# Patient Record
Sex: FEMALE | Race: WHITE | NOT HISPANIC OR LATINO | Employment: PART TIME | URBAN - METROPOLITAN AREA
[De-identification: names, ages, dates, MRNs, and addresses within clinical notes are randomized per-mention and may not be internally consistent; named-entity substitution may affect disease eponyms.]

---

## 2017-05-30 ENCOUNTER — ALLSCRIPTS OFFICE VISIT (OUTPATIENT)
Dept: OTHER | Facility: OTHER | Age: 42
End: 2017-05-30

## 2017-05-30 DIAGNOSIS — R53.83 OTHER FATIGUE: ICD-10-CM

## 2017-05-30 DIAGNOSIS — R20.2 PARESTHESIA OF SKIN: ICD-10-CM

## 2017-05-30 DIAGNOSIS — R25.2 CRAMP AND SPASM: ICD-10-CM

## 2017-05-30 DIAGNOSIS — R61 GENERALIZED HYPERHIDROSIS: ICD-10-CM

## 2017-05-30 DIAGNOSIS — K06.8 OTHER SPECIFIED DISORDERS OF GINGIVA AND EDENTULOUS ALVEOLAR RIDGE: ICD-10-CM

## 2017-05-31 ENCOUNTER — LAB CONVERSION - ENCOUNTER (OUTPATIENT)
Dept: OTHER | Facility: OTHER | Age: 42
End: 2017-05-31

## 2017-05-31 LAB
25(OH)D3 SERPL-MCNC: 29 NG/ML (ref 30–100)
A/G RATIO (HISTORICAL): 1.9 (CALC) (ref 1–2.5)
ALBUMIN SERPL BCP-MCNC: 4.6 G/DL (ref 3.6–5.1)
ALP SERPL-CCNC: 47 U/L (ref 33–115)
ALT SERPL W P-5'-P-CCNC: 11 U/L (ref 6–29)
AST SERPL W P-5'-P-CCNC: 16 U/L (ref 10–30)
BACTERIA UR QL AUTO: ABNORMAL /HPF
BASOPHILS # BLD AUTO: 1.3 %
BASOPHILS # BLD AUTO: 69 CELLS/UL (ref 0–200)
BILIRUB SERPL-MCNC: 1 MG/DL (ref 0.2–1.2)
BILIRUB UR QL STRIP: NEGATIVE
BUN SERPL-MCNC: 16 MG/DL (ref 7–25)
BUN/CREA RATIO (HISTORICAL): NORMAL (CALC) (ref 6–22)
CALCIUM SERPL-MCNC: 9.4 MG/DL (ref 8.6–10.2)
CHLORIDE SERPL-SCNC: 102 MMOL/L (ref 98–110)
CHOLEST SERPL-MCNC: 178 MG/DL (ref 125–200)
CHOLEST/HDLC SERPL: 2.5 (CALC)
CO2 SERPL-SCNC: 28 MMOL/L (ref 20–31)
COLOR UR: YELLOW
COMMENT (HISTORICAL): CLEAR
CREAT SERPL-MCNC: 0.72 MG/DL (ref 0.5–1.1)
CULTURE RESULT (HISTORICAL): ABNORMAL
DEPRECATED RDW RBC AUTO: 13.4 % (ref 11–15)
EGFR AFRICAN AMERICAN (HISTORICAL): 121 ML/MIN/1.73M2
EGFR-AMERICAN CALC (HISTORICAL): 105 ML/MIN/1.73M2
EOSINOPHIL # BLD AUTO: 244 CELLS/UL (ref 15–500)
EOSINOPHIL # BLD AUTO: 4.6 %
FECAL OCCULT BLOOD DIAGNOSTIC (HISTORICAL): ABNORMAL
FOLATE SERPL-MCNC: 19.6 NG/ML
GAMMA GLOBULIN (HISTORICAL): 2.4 G/DL (CALC) (ref 1.9–3.7)
GLUCOSE (HISTORICAL): 90 MG/DL (ref 65–99)
GLUCOSE (HISTORICAL): NEGATIVE
HBA1C MFR BLD HPLC: 5 % OF TOTAL HGB
HCT VFR BLD AUTO: 37.3 % (ref 35–45)
HDLC SERPL-MCNC: 72 MG/DL
HGB BLD-MCNC: 12.6 G/DL (ref 11.7–15.5)
HYALINE CASTS #/AREA URNS LPF: ABNORMAL /LPF
IGA (HISTORICAL): 100 MG/DL (ref 81–463)
INTERPRETATION (HISTORICAL): NORMAL
KETONES UR STRIP-MCNC: NEGATIVE MG/DL
LDL CHOLESTEROL (HISTORICAL): 86 MG/DL (CALC)
LEUKOCYTE ESTERASE UR QL STRIP: NEGATIVE
LYME IGG/IGM AB (HISTORICAL): <0.9 INDEX
LYMPHOCYTES # BLD AUTO: 1940 CELLS/UL (ref 850–3900)
LYMPHOCYTES # BLD AUTO: 36.6 %
MCH RBC QN AUTO: 31.2 PG (ref 27–33)
MCHC RBC AUTO-ENTMCNC: 33.9 G/DL (ref 32–36)
MCV RBC AUTO: 92.2 FL (ref 80–100)
MONOCYTES # BLD AUTO: 302 CELLS/UL (ref 200–950)
MONOCYTES (HISTORICAL): 5.7 %
NEUTROPHILS # BLD AUTO: 2745 CELLS/UL (ref 1500–7800)
NEUTROPHILS # BLD AUTO: 51.8 %
NITRITE UR QL STRIP: NEGATIVE
NON-HDL-CHOL (CHOL-HDL) (HISTORICAL): 106 MG/DL (CALC)
PH UR STRIP.AUTO: 7 [PH] (ref 5–8)
PLATELET # BLD AUTO: 315 THOUSAND/UL (ref 140–400)
PMV BLD AUTO: 9.2 FL (ref 7.5–12.5)
POTASSIUM SERPL-SCNC: 4.4 MMOL/L (ref 3.5–5.3)
PROT UR STRIP-MCNC: NEGATIVE MG/DL
RBC # BLD AUTO: 4.04 MILLION/UL (ref 3.8–5.1)
RBC (HISTORICAL): ABNORMAL /HPF
SODIUM SERPL-SCNC: 139 MMOL/L (ref 135–146)
SP GR UR STRIP.AUTO: 1.01 (ref 1–1.03)
SQUAMOUS EPITHELIAL CELLS (HISTORICAL): ABNORMAL /HPF
TOTAL PROTEIN (HISTORICAL): 7 G/DL (ref 6.1–8.1)
TRIGL SERPL-MCNC: 101 MG/DL
TSH SERPL DL<=0.05 MIU/L-ACNC: 1.22 MIU/L
TTG IGA (HISTORICAL): 1 U/ML
VIT B12 SERPL-MCNC: 364 PG/ML (ref 200–1100)
WBC # BLD AUTO: 5.3 THOUSAND/UL (ref 3.8–10.8)
WBC # BLD AUTO: ABNORMAL /HPF

## 2017-06-06 ENCOUNTER — GENERIC CONVERSION - ENCOUNTER (OUTPATIENT)
Dept: OTHER | Facility: OTHER | Age: 42
End: 2017-06-06

## 2017-10-11 ENCOUNTER — GENERIC CONVERSION - ENCOUNTER (OUTPATIENT)
Dept: OTHER | Facility: OTHER | Age: 42
End: 2017-10-11

## 2018-01-02 ENCOUNTER — APPOINTMENT (OUTPATIENT)
Dept: LAB | Facility: HOSPITAL | Age: 43
End: 2018-01-02
Payer: COMMERCIAL

## 2018-01-02 ENCOUNTER — ALLSCRIPTS OFFICE VISIT (OUTPATIENT)
Dept: OTHER | Facility: OTHER | Age: 43
End: 2018-01-02

## 2018-01-02 DIAGNOSIS — R31.29 OTHER MICROSCOPIC HEMATURIA: ICD-10-CM

## 2018-01-02 DIAGNOSIS — N39.0 URINARY TRACT INFECTION: ICD-10-CM

## 2018-01-02 LAB
BILIRUB UR QL STRIP: NEGATIVE
CLARITY UR: NORMAL
COLOR UR: YELLOW
GLUCOSE (HISTORICAL): NEGATIVE
HGB UR QL STRIP.AUTO: 2
KETONES UR STRIP-MCNC: NEGATIVE MG/DL
LEUKOCYTE ESTERASE UR QL STRIP: NEGATIVE
NITRITE UR QL STRIP: NEGATIVE
PH UR STRIP.AUTO: 6 [PH]
PROT UR STRIP-MCNC: 1 MG/DL
SP GR UR STRIP.AUTO: 1.01
UROBILINOGEN UR QL STRIP.AUTO: 0.2

## 2018-01-02 PROCEDURE — 87077 CULTURE AEROBIC IDENTIFY: CPT

## 2018-01-02 PROCEDURE — 87086 URINE CULTURE/COLONY COUNT: CPT

## 2018-01-02 PROCEDURE — 87186 SC STD MICRODIL/AGAR DIL: CPT

## 2018-01-03 NOTE — PROGRESS NOTES
Assessment   1  Urinary tract infection (599 0) (N39 0)   2  Microscopic hematuria (599 72) (R31 29)    Plan   Microscopic hematuria    · (1) URINALYSIS w URINE C/S REFLEX (will reflex a microscopy if leukocytes, occult    blood, or nitrites are not within normal limits); Status:Active; Requested YOT:32FJM4631;   Microscopic hematuria, Urinary tract infection    · (1) URINE CULTURE; Source:Urine, Clean Catch; Status:Active; Requested    FVF:46LBO1227;   Pain with urination    · Urine Dip Automated- POC; Status:Complete - Retrospective By Protocol Authorization;      Done: 36KHN2808 02:54PM  Urinary tract infection    · Nitrofurantoin Monohyd Macro 100 MG Oral Capsule; TAKE 1 CAPSULE TWICE    DAILY   · Drink at least 6 glasses of clear liquids a day ; Status:Complete;   Done: 55ERK1776   · Call [phone] if: The symptoms are not better in 2 days ; Status:Complete;   Done:    27YGL8447   · Call [phone] if: The symptoms come back after the medications are finished ;    Status:Complete;   Done: 56KUD5482   · Call [phone] if: You have pain in the kidney or low back area ; Status:Complete;      Done: 13VXS4861   · Call [phone] if: You see blood in your urine ; Status:Complete;   Done:    37EAW4201   · Call [phone] if: You start vomiting ; Status:Complete;   Done: 41XBH4197   · Call [phone] if: Your temperature is higher than 102F ; Status:Complete;   Done:    64EWE6859    Discussion/Summary       dip without pyuria, 2+ hematuria only, but her symptoms are typical of UTI, so will treat presumptively, sending urine culture for confirmation  Push fluids  Repeat UA in 1 month, at least a week after completion of menses, to ensure resolution of hematuria  Call if no improvement/worsening/red flag symptoms next 24-48 hours  Possible side effects of new medications were reviewed with the patient/guardian today  The treatment plan was reviewed with the patient/guardian   The patient/guardian understands and agrees with the treatment plan      Chief Complaint   Pt here c/o pain with urination  12/12/17      History of Present Illness   HPI:  with complaints of urinary frequency, burning, urgency, mild bladder/lower back discomfort x 2 days  No change in urine color, odor noted  No fever/chills, N/V, spotting, discharge  Symptoms somewhat typical of previous UTI's, most recently a couple years ago  Admits to not drinking as much as she should  on 12/12  Review of Systems        Constitutional: no fever-- and-- no chills  Gastrointestinal: as noted in HPI  Genitourinary: as noted in HPI  Active Problems   1  Anxiety (300 00) (F41 9)   2  Bleeding gums (523 8) (K06 8)   3  Fatigue (780 79) (R53 83)   4  Hot flashes (627 2) (N95 1)   5  Long term use of drug (V58 69) (Z79 899)   6  Muscle cramps (729 82) (R25 2)   7  Night sweats (780 8) (R61)   8  Paresthesias (782 0) (R20 2)    Past Medical History   1  Acute pharyngitis (462) (J02 9)   2  History of Acute UTI (599 0) (N39 0)   3  Bleeding gums (523 8) (K06 8)   4  Fatigue (780 79) (R53 83)   5  History of Fractured coccyx (805 6) (S32 2XXA)   6  History of acute sinusitis (V12 69) (Z87 09)   7  Microscopic hematuria (599 72) (R31 29)   8  Muscle cramps (729 82) (R25 2)   9  Night sweats (780 8) (R61)   10  Paresthesias (782 0) (R20 2)   11  History of Postconcussion syndrome (310 2) (F07 81)   12  Urinary tract infection (599 0) (N39 0)   13  History of Urinary Tract Infection (V13 02)   14  History of X-Ray Of The Elbow Fracture   15  History of X-Ray Wrist Fracture  Active Problems And Past Medical History Reviewed: The active problems and past medical history were reviewed and updated today  Family History   Mother    1  Family history of Spina Bifida  Maternal Grandfather    2  Family history of Diabetes Mellitus (V18 0)   3   Family history of Prostate Cancer (V16 42)    Social History    · Alcohol   · OCCASSIONAL   · Denied: History of Drug Use   · Education History   · COLLEGE GRAD   · Former smoker (X50 26) (P51 601)   · Marital History - Currently    · 1 CHIL;D    Current Meds    1  Sertraline HCl - 50 MG Oral Tablet; TAKE 1 & ONE-HALF TABLETS BY MOUTH EVERY     DAY; Therapy: 29SOG0102 to (Evaluate:01Zwl3388)  Requested for: 97RSD5273; Last     Rx:28Nov2017 Ordered    Allergies   1  Sulfa Drugs    Vitals    Recorded: 74PUB9810 02:43PM   Temperature 97 2 F   Heart Rate 85   Systolic 819   Diastolic 72   Weight 456 lb 8 oz   BMI Calculated 18 87   BSA Calculated 1 63   O2 Saturation 98     Physical Exam        Constitutional      General appearance: No acute distress, well appearing and well nourished  Pulmonary      Respiratory effort: No increased work of breathing or signs of respiratory distress  Abdomen      Abdomen: Non-tender, no masses  The abdomen was soft and nontender   no CVA tenderness      Results/Data   Urine Dip Automated- POC 23VOY9978 02:54PM [facility]      Test Name Result Flag Reference   Color Yellow     Clarity Transparent     Leukocytes negative     Nitrite negative     Blood 2     Bilirubin negative     Urobilinogen 0 2     Protein 1     Ph 6 0     Specific Gravity 1 015     Ketone negative     Glucose negative          Signatures    Electronically signed by : GILES Villavicencio; Jan 2 2018  3:02PM EST                       (Author)     Electronically signed by : Janet Terrell DO; Jan 2 2018  3:14PM EST                       (Author)

## 2018-01-05 LAB — BACTERIA UR CULT: ABNORMAL

## 2018-01-08 ENCOUNTER — GENERIC CONVERSION - ENCOUNTER (OUTPATIENT)
Dept: OTHER | Facility: OTHER | Age: 43
End: 2018-01-08

## 2018-01-12 NOTE — PROGRESS NOTES
Assessment    1  Acute UTI (599 0) (N39 0)    Plan  Acute UTI    · Nitrofurantoin Monohyd Macro 100 MG Oral Capsule; TAKE 1 CAPSULE EVERY 12  HOURS DAILY   · Drink plenty of fluids ; Status:Complete;   Done: 91KBY8804   · There are several things women can do to treat and prevent bladder infections ;  Status:Complete;   Done: 81BOX7049   · Call [phone] if: Pain when you urinate is not better in 3 days ; Status:Complete;    Done: 77DOV6514   · Call [phone] if: The symptoms come back after the medications are finished ;  Status:Complete;   Done: 67ISP5913   · Call [phone] if: You have pain in the kidney or low back area ; Status:Complete;    Done: 82NWO4846   · Call [phone] if: Your temperature is higher than 101F ; Status:Complete;   Done:  84ZXF4693     Will culture if persisting symptoms  Chief Complaint  UTI      History of Present Illness  HPI: She started at 0500 with dysuria and hematuria associated with a backache  No fever  Frequency  She took AZO at [facility], which helped  LMP was 2 weeks ago   had a vasectomy  No Hx stones  Last UTI was 2011  Cletis Isabell seems to work well for the UTIs  Active Problems    1  Anxiety (300 00) (F41 9)   2  Hot flashes (627 2) (N95 1)   3  Long term use of drug (V58 69) (Z79 899)    Past Medical History    1  Acute pharyngitis (462) (J02 9)   2  History of Fractured coccyx (805 6) (S32 2XXA)   3  History of acute sinusitis (V12 69) (Z87 09)   4  History of Postconcussion syndrome (310 2) (F07 81)   5  History of Urinary Tract Infection (V13 02)   6  History of X-Ray Of The Elbow Fracture   7  History of X-Ray Wrist Fracture    Family History    1  Family history of Spina Bifida    2  Family history of Diabetes Mellitus (V18 0)   3   Family history of Prostate Cancer (V16 42)    Social History    · Alcohol   · OCCASSIONAL   · Denied: History of Drug Use   · Education History   · COLLEGE GRAD   · Former smoker (A22 71) (J78 824)   · Marital History - Currently    · 1 CHIL;D    Current Meds   1  Ibuprofen 600 MG Oral Tablet; TAKE ONE TABLET(S) EVERY 6 HOURS AS   NECESSARY; Therapy: 64QZB1237 to (Evaluate:35Olt1193)  Requested for: 72YXB3678; Last   Rx:10Nov2014 Ordered   2  Sertraline HCl - 50 MG Oral Tablet; TAKE 1 & ONE-HALF TABLETS BY MOUTH EVERY   DAY; Therapy: 88QTX0435 to ([facility])  Requested for: 25Rqj2531; Last   Rx:39Slr2474 Ordered    Allergies    1  Sulfa Drugs    Vitals   Recorded: 49RNH0469 02:11PM   Temperature 97 2 F, Tympanic   Heart Rate 60, L Radial   Pulse Quality Norm   Respiration 14   Respiration Quality Norm   Systolic 257, LUE, Sitting   Diastolic 68, LUE, Sitting   Height 5 ft 7 in   Weight 122 lb    BMI Calculated 19 11   BSA Calculated 1 64     Physical Exam    Constitutional   General appearance: No acute distress, well appearing and well nourished  Pulmonary   Respiratory effort: No increased work of breathing or signs of respiratory distress  Auscultation of lungs: Clear to auscultation  Cardiovascular   Auscultation of heart: Normal rate and rhythm, normal S1 and S2, without murmurs  Abdomen   Abdomen: Non-tender, no masses  no CVA tenderness   Liver and spleen: No hepatomegaly or splenomegaly  Psychiatric   Orientation to person, place, and time: Normal     Mood and affect: Normal          Results/Data   Can't dip the urine due to AZO use          Signatures   Electronically signed by : CAMERON Mercer ; Feb 1 2016  2:23PM EST                       (Author)

## 2018-01-13 VITALS
DIASTOLIC BLOOD PRESSURE: 80 MMHG | WEIGHT: 115.31 LBS | RESPIRATION RATE: 16 BRPM | SYSTOLIC BLOOD PRESSURE: 128 MMHG | HEART RATE: 80 BPM | HEIGHT: 67 IN | BODY MASS INDEX: 18.1 KG/M2 | TEMPERATURE: 97.5 F

## 2018-01-17 NOTE — RESULT NOTES
Discussion/Summary      Normal lab results except for mildly low vitamin D which is not likely the cause of your symptoms  Would't be a bad idea to take a daily OTC vitamin D supplement 9412-7809 IU as a precaution, however  Advise getting additional blood work done, making appointment with neurologist        Will call with additional test results when they come through  --Izaiah Bustos     Verified Results  (1) CBC/PLT/DIFF 55UNM5021 09:48AM Phong Echavarria     Test Name Result Flag Reference   WHITE BLOOD CELL COUNT 5 3 Thousand/uL  3 8-10 8   RED BLOOD CELL COUNT 4 04 Million/uL  3 80-5 10   HEMOGLOBIN 12 6 g/dL  11 7-15 5   HEMATOCRIT 37 3 %  35 0-45 0   MCV 92 2 fL  80 0-100 0   MCH 31 2 pg  27 0-33 0   MCHC 33 9 g/dL  32 0-36 0   RDW 13 4 %  11 0-15 0   PLATELET COUNT 192 Thousand/uL  140-400   ABSOLUTE NEUTROPHILS 2745 cells/uL  2794-4139   ABSOLUTE LYMPHOCYTES 1940 cells/uL  850-3900   ABSOLUTE MONOCYTES 302 cells/uL  200-950   ABSOLUTE EOSINOPHILS 244 cells/uL     ABSOLUTE BASOPHILS 69 cells/uL  0-200   NEUTROPHILS 51 8 %     LYMPHOCYTES 36 6 %     MONOCYTES 5 7 %     EOSINOPHILS 4 6 %     BASOPHILS 1 3 %     MPV 9 2 fL  7 5-12 5     (1) COMPREHENSIVE METABOLIC PANEL 89RSX5656 84:50HQ Phong Referly     Test Name Result Flag Reference   GLUCOSE 90 mg/dL  65-99   Fasting reference interval   UREA NITROGEN (BUN) 16 mg/dL  7-25   CREATININE 0 72 mg/dL  0 50-1 10   eGFR NON-AFR   AMERICAN 105 mL/min/1 73m2  > OR = 60   eGFR AFRICAN AMERICAN 121 mL/min/1 73m2  > OR = 60   BUN/CREATININE RATIO   6-80   NOT APPLICABLE (calc)   SODIUM 139 mmol/L  135-146   POTASSIUM 4 4 mmol/L  3 5-5 3   CHLORIDE 102 mmol/L     CARBON DIOXIDE 28 mmol/L  20-31   CALCIUM 9 4 mg/dL  8 6-10 2   PROTEIN, TOTAL 7 0 g/dL  6 1-8 1   ALBUMIN 4 6 g/dL  3 6-5 1   GLOBULIN 2 4 g/dL (calc)  1 9-3 7   ALBUMIN/GLOBULIN RATIO 1 9 (calc)  1 0-2 5   BILIRUBIN, TOTAL 1 0 mg/dL  0 2-1 2   ALKALINE PHOSPHATASE 47 U/L     AST 16 U/L 10-30   ALT 11 U/L  6-29     (1) VITAMIN B12 59PSU0630 09:48AM [facility]     Test Name Result Flag Reference   VITAMIN B12 364 pg/mL  200-1100   Please Note: Although the reference range for vitamin  B12 is 200-1100 pg/mL, it has been reported that between  5 and 10% of patients with values between 200 and 400  pg/mL may experience neuropsychiatric and hematologic  abnormalities due to occult B12 deficiency; less than 1%  of patients with values above 400 pg/mL will have symptoms  (1) URINALYSIS w URINE C/S REFLEX (will reflex a microscopy if leukocytes, occult blood, or nitrites are not within normal limits) 99JFY4642 09:48AM [facility]     Test Name Result Flag Reference   SPECIFIC GRAVITY 1 006  1 001-1 035   BILIRUBIN NEGATIVE  NEGATIVE   GLUCOSE NEGATIVE  NEGATIVE   COLOR YELLOW  YELLOW   APPEARANCE CLEAR  CLEAR   PH 7 0  5 0-8 0   KETONES NEGATIVE  NEGATIVE   OCCULT BLOOD TRACE A NEGATIVE   PROTEIN NEGATIVE  NEGATIVE   SQUAMOUS EPITHELIAL CELLS 0-5 /HPF  < OR = 5   HYALINE CAST NONE SEEN /LPF  NONE SEEN   REFLEXIVE URINE CULTURE NO CULTURE INDICATED     RBC 0-2 /HPF  < OR = 2   NITRITE NEGATIVE  NEGATIVE   LEUKOCYTE ESTERASE NEGATIVE  NEGATIVE   WBC NONE SEEN /HPF  < OR = 5   BACTERIA NONE SEEN /HPF  NONE SEEN     (1) FOLATE 56XWJ9748 09:48AM [facility]     Test Name Result Flag Reference   FOLATE, SERUM 19 6 ng/mL     Reference Range                             Low:           <3 4                             Borderline:    3 4-5 4                             Normal:        >5 4     (Q) LIPID PANEL WITH REFLEX TO DIRECT LDL 58DKJ5550 09:48AM [facility]     Test Name Result Flag Reference   CHOLESTEROL, TOTAL 178 mg/dL  125-200   HDL CHOLESTEROL 72 mg/dL  > OR = 46   TRIGLICERIDES 503 mg/dL  <150   LDL-CHOLESTEROL 86 mg/dL (calc)  <130   Desirable range <100 mg/dL for patients with CHD or  diabetes and <70 mg/dL for diabetic patients with  known heart disease     CHOL/HDLC RATIO 2 5 (calc)  < OR = 5  0   NON HDL CHOLESTEROL 106 mg/dL (calc)     Target for non-HDL cholesterol is 30 mg/dL higher than   LDL cholesterol target  (Q) CELIAC DISEASE COMPREHENSIVE PANEL 14XMK5980 09:48AM Jeff Smith     Test Name Result Flag Reference   TISSUE TRANSGLUTAMINASE$ANTIBODY, IGA 1 U/mL     Value      Interpretation         -----      --------------         <4         No Antibody Detected         > or = 4   Antibody Detected   IMMUNOGLOBULIN A 100 mg/dL     INTERPRETATION      No serological evidence of celiac disease  tTG IgA may normalize in individuals with celiac disease  who maintain a gluten-free diet  Consider HLA DQ2 and   DQ8 testing to rule out celiac disease  Celiac disease   is extremely rare in the absence of DQ2 or DQ8  (Q) LYME DISEASE AB, TOTAL W/REFL WB (IGG, IGM) 51OSC1561 09:48AM Jeff Smith     Test Name Result Flag Reference   LYME AB SCREEN <0 90 index     Index                Interpretation                     -----                --------------                     < 0 90               Negative                     0  90-1 09            Equivocal                     > 1 09               Positive      As recommended by the Food and Drug Administration   (FDA), all samples with positive or equivocal   results in a Borrelia burgdorferi antibody screen  will be tested using a blot method  Positive or   equivocal screening test results should not be   interpreted as truly positive until verified as such   using a supplemental assay (e g , B  burgdorferi blot)  The screening test and/or blot for B  burgdorferi   antibodies may be falsely negative in early stages  of Lyme disease, including the period when erythema   migrans is apparent       *(Q) VITAMIN D, 25-HYDROXY, LC/MS/MS [id number] 09:48AM Jeff Smith     Test Name Result Flag Reference   VITAMIN D, 25-OH, TOTAL 29 ng/mL L    Vitamin D Status         25-OH Vitamin D:     Deficiency:                    <20 ng/mL  Insufficiency:             20 - 29 ng/mL  Optimal:                 > or = 30 ng/mL     For 25-OH Vitamin D testing on patients on   D2-supplementation and patients for whom quantitation   of D2 and D3 fractions is required, the QuestAssureD(TM)  25-OH VIT D, (D2,D3), LC/MS/MS is recommended: order   code 22863 (patients >2yrs)  For more information on this test, go to:  http://Confide/faq/ALS260  (This link is being provided for   informational/educational purposes only )     (Q) TSH, 3RD GENERATION W/REFLEX TO FT4 37WMO4320 09:48AM Cirilo Dobbs     Test Name Result Flag Reference   TSH W/REFLEX TO FT4 1 22 mIU/L     Reference Range                         > or = 20 Years  0 40-4 50                              Pregnancy Ranges            First trimester    0 26-2 66            Second trimester   0 55-2 73            Third trimester    0 43-2 91     (Q) HEMOGLOBIN A1c 48RCI3247 09:48AM Cirilo Dobbs   REPORT COMMENT:  FASTING:YES     Test Name Result Flag Reference   HEMOGLOBIN A1c 5 0 % of total Hgb  <5 7   For the purpose of screening for the presence of  diabetes:     <5 7%       Consistent with the absence of diabetes  5 7-6 4%    Consistent with increased risk for diabetes              (prediabetes)  > or =6 5%  Consistent with diabetes     This assay result is consistent with a decreased risk  of diabetes  Currently, no consensus exists regarding use of  hemoglobin A1c for diagnosis of diabetes in children  According to American Diabetes Association (ADA)  guidelines, hemoglobin A1c <7 0% represents optimal  control in non-pregnant diabetic patients  Different  metrics may apply to specific patient populations  Standards of Medical Care in Diabetes(ADA)  Plan  Fatigue, Muscle cramps, Night sweats, Paresthesias    · (1) ALDOLASE; Status:Active; Requested MJV:64NLS1577;    · (1) CK (CPK); Status:Active;  Requested MARYLOU:86LBB1016;    · (1) C-REACTIVE PROTEIN; Status:Active; Requested [id number];    · (1) PROTEIN ELECTRO, SERUM; Status:Active; Requested QCU:98KSM2460;    · (1) PT WITH INR; Status:Active; Requested [id number];    · (1) SED RATE; Status:Active;  Requested SBT:53GKQ2629;    · *1 - [facility]  *  Status: Active  Requested for: 65UMB6414  Care Summary provided  : Yes

## 2018-01-23 VITALS
BODY MASS INDEX: 18.87 KG/M2 | SYSTOLIC BLOOD PRESSURE: 122 MMHG | WEIGHT: 120.5 LBS | DIASTOLIC BLOOD PRESSURE: 72 MMHG | TEMPERATURE: 97.2 F | OXYGEN SATURATION: 98 % | HEART RATE: 85 BPM

## 2018-01-23 NOTE — RESULT NOTES
Verified Results  (1) URINE CULTURE 02Jan2018 02:25PM Nola Berman   TW Order Number: [id number]     Test Name Result Flag Reference   CLINICAL REPORT (Report) A    Test:        Urine culture  Specimen Type:   Urine  Specimen Date:   1/2/2018 2:25 PM  Result Date:    1/5/2018 6:00 PM  Result Status:   Final result  Abnormal:      Yes  Resulting Lab:   [facility] [address] [place] [id number]            Tel: [phone]      CULTURE                                       ------------------                                   >100,000 cfu/ml Escherichia coli (Abnormal)      SUSCEPTIBILITY                                   ------------------                                                       Escherichia coli  METHOD                 IVNCE  -------------------------------------  -------------------------  AMPICILLIN ($$)             <=8 00 ug/ml Susceptible  AZTREONAM ($$$)             <=8 ug/ml   Susceptible  CEFAZOLIN ($)              <=8 00 ug/ml Susceptible  CIPROFLOXACIN ($)            <=1 00 ug/ml Susceptible  GENTAMICIN ($$)             <=4 ug/ml   Susceptible  LEVOFLOXACIN ($)            <=2 00 ug/ml Susceptible  NITROFURANTOIN             <=32 ug/ml  Susceptible  PIPERACILLIN + TAZOBACTAM ($$$)     <=16 ug/ml  Susceptible  TETRACYCLINE              <=4 ug/ml   Susceptible  TOBRAMYCIN ($)             <=4 ug/ml   Susceptible  TRIMETHOPRIM + SULFAMETHOXAZOLE ($$$)  <=2/38 ug/ml Susceptible

## 2018-01-29 DIAGNOSIS — R31.29 OTHER MICROSCOPIC HEMATURIA: ICD-10-CM

## 2018-03-15 DIAGNOSIS — F32.A DEPRESSION, UNSPECIFIED DEPRESSION TYPE: Primary | ICD-10-CM

## 2018-08-11 DIAGNOSIS — F32.A DEPRESSION, UNSPECIFIED DEPRESSION TYPE: ICD-10-CM

## 2018-09-06 DIAGNOSIS — F32.A DEPRESSION, UNSPECIFIED DEPRESSION TYPE: ICD-10-CM

## 2018-10-17 DIAGNOSIS — F32.A DEPRESSION, UNSPECIFIED DEPRESSION TYPE: ICD-10-CM

## 2018-11-13 ENCOUNTER — AMB VIDEO VISIT (OUTPATIENT)
Dept: URGENT CARE | Facility: MEDICAL CENTER | Age: 43
End: 2018-11-13
Payer: COMMERCIAL

## 2018-11-13 DIAGNOSIS — N39.0 URINARY TRACT INFECTION WITHOUT HEMATURIA, SITE UNSPECIFIED: Primary | ICD-10-CM

## 2018-11-13 PROCEDURE — 99201 PR OFFICE OUTPATIENT NEW 10 MINUTES: CPT | Performed by: FAMILY MEDICINE

## 2018-11-13 RX ORDER — CIPROFLOXACIN 250 MG/1
250 TABLET, FILM COATED ORAL EVERY 12 HOURS SCHEDULED
Qty: 6 TABLET | Refills: 0 | Status: SHIPPED | OUTPATIENT
Start: 2018-11-13 | End: 2018-11-16

## 2018-11-13 NOTE — PATIENT INSTRUCTIONS
I prescribed Cipro 250 mg twice a day for 3 days  Advised patient to increase fluid intake  If symptoms persist beyond 3 days, she was advised to follow up with her primary care provider  She expressed understanding

## 2018-11-13 NOTE — PROGRESS NOTES
[facility]        NAME: Fritz Garza is a 41 y o  female  : 1975    MRN: [id number]  DATE: 2018  TIME: 1:19 PM    Assessment and Plan   Urinary tract infection without hematuria, site unspecified [N39 0]  1  Urinary tract infection without hematuria, site unspecified  ciprofloxacin (CIPRO) 250 mg tablet         Patient Instructions       Follow up with PCP in 3-5 days  Proceed to  ER if symptoms worsen  Chief Complaint   No chief complaint on file  History of Present Illness       E visit  Patient complaining of UTI symptoms began last night  Symptoms consist of dysuria, urinary frequency and mild lower abdominal pain  However, she denies fever or chills  Her last UTI was back in July  Review of Systems   Review of Systems   Constitutional: Negative  Gastrointestinal: Negative  Genitourinary: Positive for dysuria and frequency  Current Medications       Current Outpatient Prescriptions:     ciprofloxacin (CIPRO) 250 mg tablet, Take 1 tablet (250 mg total) by mouth every 12 (twelve) hours for 3 days, Disp: 6 tablet, Rfl: 0    sertraline (ZOLOFT) 50 mg tablet, Take 1 5 tablets (75 mg total) by mouth daily, Disp: 45 tablet, Rfl: 3    Current Allergies     Allergies as of 2018    (Not on File)            The following portions of the patient's history were reviewed and updated as appropriate: allergies, current medications, past family history, past medical history, past social history, past surgical history and problem list      No past medical history on file  No past surgical history on file  No family history on file  Medications have been verified  Objective   There were no vitals taken for this visit  Physical Exam     Physical Exam   Nursing note and vitals reviewed

## 2018-11-20 ENCOUNTER — APPOINTMENT (OUTPATIENT)
Dept: LAB | Facility: CLINIC | Age: 43
End: 2018-11-20
Payer: COMMERCIAL

## 2018-11-20 ENCOUNTER — HOSPITAL ENCOUNTER (OUTPATIENT)
Dept: CT IMAGING | Facility: HOSPITAL | Age: 43
Discharge: HOME | End: 2018-11-20
Attending: NURSE PRACTITIONER
Payer: COMMERCIAL

## 2018-11-20 ENCOUNTER — OFFICE VISIT (OUTPATIENT)
Dept: FAMILY MEDICINE CLINIC | Facility: OTHER | Age: 43
End: 2018-11-20
Payer: COMMERCIAL

## 2018-11-20 VITALS
HEIGHT: 66 IN | DIASTOLIC BLOOD PRESSURE: 80 MMHG | TEMPERATURE: 97.6 F | BODY MASS INDEX: 19.82 KG/M2 | SYSTOLIC BLOOD PRESSURE: 102 MMHG | OXYGEN SATURATION: 98 % | WEIGHT: 123.3 LBS | HEART RATE: 76 BPM

## 2018-11-20 DIAGNOSIS — R11.2 NAUSEA AND VOMITING, INTRACTABILITY OF VOMITING NOT SPECIFIED, UNSPECIFIED VOMITING TYPE: ICD-10-CM

## 2018-11-20 DIAGNOSIS — R10.31 RIGHT LOWER QUADRANT ABDOMINAL PAIN: Primary | ICD-10-CM

## 2018-11-20 DIAGNOSIS — R10.31 RIGHT LOWER QUADRANT ABDOMINAL PAIN: ICD-10-CM

## 2018-11-20 DIAGNOSIS — R35.0 URINARY FREQUENCY: ICD-10-CM

## 2018-11-20 DIAGNOSIS — R31.29 OTHER MICROSCOPIC HEMATURIA: ICD-10-CM

## 2018-11-20 LAB
ALBUMIN SERPL BCP-MCNC: 4.4 G/DL (ref 3.5–5)
ALP SERPL-CCNC: 47 U/L (ref 46–116)
ALT SERPL W P-5'-P-CCNC: 26 U/L (ref 12–78)
AMYLASE SERPL-CCNC: 38 IU/L (ref 25–115)
ANION GAP SERPL CALCULATED.3IONS-SCNC: 10 MMOL/L (ref 4–13)
AST SERPL W P-5'-P-CCNC: 19 U/L (ref 5–45)
B-HCG SERPL-ACNC: <2 MIU/ML
BASOPHILS # BLD AUTO: 0.07 THOUSANDS/ΜL (ref 0–0.1)
BASOPHILS NFR BLD AUTO: 1 % (ref 0–1)
BILIRUB SERPL-MCNC: 1.5 MG/DL (ref 0.2–1)
BUN SERPL-MCNC: 13 MG/DL (ref 5–25)
CALCIUM SERPL-MCNC: 9 MG/DL (ref 8.3–10.1)
CHLORIDE SERPL-SCNC: 103 MMOL/L (ref 100–108)
CO2 SERPL-SCNC: 25 MMOL/L (ref 21–32)
CREAT SERPL-MCNC: 0.82 MG/DL (ref 0.6–1.3)
CRP SERPL QL: <3 MG/L
EOSINOPHIL # BLD AUTO: 0.28 THOUSAND/ΜL (ref 0–0.61)
EOSINOPHIL NFR BLD AUTO: 4 % (ref 0–6)
ERYTHROCYTE [DISTWIDTH] IN BLOOD BY AUTOMATED COUNT: 12.4 % (ref 11.6–15.1)
ERYTHROCYTE [SEDIMENTATION RATE] IN BLOOD: 5 MM/HOUR (ref 0–20)
GFR SERPL CREATININE-BSD FRML MDRD: 89 ML/MIN/1.73SQ M
GLUCOSE SERPL-MCNC: 95 MG/DL (ref 65–140)
HCT VFR BLD AUTO: 39.9 % (ref 34.8–46.1)
HGB BLD-MCNC: 13 G/DL (ref 11.5–15.4)
IMM GRANULOCYTES # BLD AUTO: 0.01 THOUSAND/UL (ref 0–0.2)
IMM GRANULOCYTES NFR BLD AUTO: 0 % (ref 0–2)
LIPASE SERPL-CCNC: 114 U/L (ref 73–393)
LYMPHOCYTES # BLD AUTO: 2.02 THOUSANDS/ΜL (ref 0.6–4.47)
LYMPHOCYTES NFR BLD AUTO: 30 % (ref 14–44)
MCH RBC QN AUTO: 31.6 PG (ref 26.8–34.3)
MCHC RBC AUTO-ENTMCNC: 32.6 G/DL (ref 31.4–37.4)
MCV RBC AUTO: 97 FL (ref 82–98)
MONOCYTES # BLD AUTO: 0.49 THOUSAND/ΜL (ref 0.17–1.22)
MONOCYTES NFR BLD AUTO: 7 % (ref 4–12)
NEUTROPHILS # BLD AUTO: 3.93 THOUSANDS/ΜL (ref 1.85–7.62)
NEUTS SEG NFR BLD AUTO: 58 % (ref 43–75)
NRBC BLD AUTO-RTO: 0 /100 WBCS
PLATELET # BLD AUTO: 268 THOUSANDS/UL (ref 149–390)
PMV BLD AUTO: 10.5 FL (ref 8.9–12.7)
POTASSIUM SERPL-SCNC: 4.2 MMOL/L (ref 3.5–5.3)
PROT SERPL-MCNC: 7.5 G/DL (ref 6.4–8.2)
RBC # BLD AUTO: 4.11 MILLION/UL (ref 3.81–5.12)
SL AMB  POCT GLUCOSE, UA: NORMAL
SL AMB LEUKOCYTE ESTERASE,UA: NORMAL
SL AMB POCT BILIRUBIN,UA: NORMAL
SL AMB POCT BLOOD,UA: NORMAL
SL AMB POCT CLARITY,UA: CLEAR
SL AMB POCT COLOR,UA: YELLOW
SL AMB POCT KETONES,UA: NORMAL
SL AMB POCT NITRITE,UA: NORMAL
SL AMB POCT PH,UA: 6
SL AMB POCT SPECIFIC GRAVITY,UA: 1
SL AMB POCT URINE PROTEIN: NORMAL
SL AMB POCT UROBILINOGEN: 0.2
SODIUM SERPL-SCNC: 138 MMOL/L (ref 136–145)
WBC # BLD AUTO: 6.8 THOUSAND/UL (ref 4.31–10.16)

## 2018-11-20 PROCEDURE — 82150 ASSAY OF AMYLASE: CPT

## 2018-11-20 PROCEDURE — 85025 COMPLETE CBC W/AUTO DIFF WBC: CPT

## 2018-11-20 PROCEDURE — 81003 URINALYSIS AUTO W/O SCOPE: CPT | Performed by: NURSE PRACTITIONER

## 2018-11-20 PROCEDURE — 74177 CT ABD & PELVIS W/CONTRAST: CPT

## 2018-11-20 PROCEDURE — 80053 COMPREHEN METABOLIC PANEL: CPT

## 2018-11-20 PROCEDURE — 99214 OFFICE O/P EST MOD 30 MIN: CPT | Performed by: NURSE PRACTITIONER

## 2018-11-20 PROCEDURE — 87086 URINE CULTURE/COLONY COUNT: CPT

## 2018-11-20 PROCEDURE — 83690 ASSAY OF LIPASE: CPT

## 2018-11-20 PROCEDURE — 85652 RBC SED RATE AUTOMATED: CPT

## 2018-11-20 PROCEDURE — 3008F BODY MASS INDEX DOCD: CPT | Performed by: NURSE PRACTITIONER

## 2018-11-20 PROCEDURE — 86140 C-REACTIVE PROTEIN: CPT

## 2018-11-20 PROCEDURE — 84702 CHORIONIC GONADOTROPIN TEST: CPT

## 2018-11-20 PROCEDURE — 36415 COLL VENOUS BLD VENIPUNCTURE: CPT

## 2018-11-20 RX ADMIN — IOHEXOL 50 ML: 240 INJECTION, SOLUTION INTRATHECAL; INTRAVASCULAR; INTRAVENOUS; ORAL at 12:32

## 2018-11-20 RX ADMIN — IOHEXOL 100 ML: 350 INJECTION, SOLUTION INTRAVENOUS at 12:31

## 2018-11-20 NOTE — PROGRESS NOTES
Assessment/Plan:         Problem List Items Addressed This Visit     None      Visit Diagnoses     Right lower quadrant abdominal pain + nausea/vomiting + urinary frequency  --Some suspicion for sub-acute appendicitis, although cannot rule out ovarian cyst/torsion, UTI, other causes  --STAT imaging and labs ordered  Called and arranged to be done this morning  --Urine dip normal in office today  Send urine for culture  --Avoid eating for now  --If imaging, labs, culture are negative, she will need to make appointment with her GYN  --ER for worsening symptoms  Relevant Orders    CBC and differential    Comprehensive metabolic panel    Sedimentation rate, automated    C-reactive protein    Amylase    Lipase    hCG, quantitative    CT abdomen pelvis w wo contrast    Urine culture                Subjective:      Patient ID: Brianna Strong is a 41 y o  female  Here with complaints of intermittent abdominal pain, nausea, urinary frequency/urgency, starting a week ago  Had SL video visit day after symptoms started  Was prescribed antibiotic (Cipro) for presumed UTI  She completed this, but symptoms are ongoing  Does not feel like previous UTI's, most recently this past summer  In this case, there is no dysuria noted or change in appearance + odor of urine, including hematuria  Pain described as constant, dull ache right mid-lower quadrant of abdomen  Sharp at times  No radiation to back, chest, or elsewhere  Still gets bouts of nausea  Threw up a couple times 3 days ago, but not since  Eating some, but appetite decreased a bit  Had small, normal consistency BM yesterday  No diarrhea, constipation, melena, hematochezia  No associated fever/chills  Rates pain 4/10 at present  No improvement with Tums  No other OTC meds tried  No change in pain with urination, defecation, position, eating  LMP 10/27  Denies spotting, discharge, itching    No hx of [place], ovarian cysts, fibroids, STI, PID, etc   Has GYN  UTD with preventative care  No recent change in partners  Previous UTI's, most recently this past summer  This feels somewhat different, however  No dysuria present  Denies hx of other  conditions including urolithiasis  Denies hx GI conditions, surgeries, including appendectomy, cholecystectomy, diverticulitis, IBS  No known food or infectious precipitants  No recent alcohol  s          The following portions of the patient's history were reviewed and updated as appropriate: current medications, past family history, past medical history, past social history, past surgical history and problem list     Review of Systems   Constitutional: Negative for fever  HENT: Negative for sore throat  Respiratory: Negative for shortness of breath  Cardiovascular: Negative for chest pain  Gastrointestinal: Positive for abdominal pain, nausea and vomiting  Negative for blood in stool, constipation and diarrhea  Genitourinary: Positive for urgency  Negative for hematuria, vaginal bleeding and vaginal discharge  Neurological: Negative for dizziness and headaches  Objective:      /80 (BP Location: Left arm, Patient Position: Sitting, Cuff Size: Adult)   Pulse 76   Temp 97 6 °F (36 4 °C) (Tympanic)   Ht 5' 6" (1 676 m)   Wt 55 9 kg (123 lb 4 8 oz)   SpO2 98%   BMI 19 90 kg/m²          Physical Exam   Constitutional: She is oriented to person, place, and time  She appears well-developed and well-nourished  HENT:   Head: Normocephalic  Right Ear: External ear normal    Left Ear: External ear normal    Nose: Nose normal    Mouth/Throat: Oropharynx is clear and moist    Eyes: Pupils are equal, round, and reactive to light  Conjunctivae are normal    Neck: Normal range of motion  Neck supple  Cardiovascular: Normal rate, regular rhythm and normal heart sounds  Pulmonary/Chest: Effort normal and breath sounds normal    Abdominal: Soft   Bowel sounds are normal  She exhibits no mass  There is tenderness  There is guarding  There is no rebound  Abdomen, soft, firm, with normal BS  Localized RLQ tenderness with mild guarding  Nontender elsewhere  Negative rebound tenderness  Negative psoas, obturator, Rovsing  Negative Luna  No CVA tenderness  Neurological: She is alert and oriented to person, place, and time  She has normal reflexes  Skin: Skin is warm and dry  Psychiatric: She has a normal mood and affect

## 2018-11-21 LAB — BACTERIA UR CULT: NORMAL

## 2018-11-26 NOTE — CARE ANYWHERE EVISITS
Visit Summary for YASMIN GOLD - Gender: Female - Date of Birth: [id number]  Date: [id number] - Duration: 3 minutes  Patient: YASMIN GOLD  Provider: Tato Judd    Patient Contact Information  Address  [place] [address]  [phone]    Visit Topics  Urinary tract infection [Added By: Self - 2018-11-13]    Conversation Transcripts     [Notification] You are connected with Family Darryl Physician  [Notification] YASMIN Tavares is located in [place]  [Notification] YASMIN GOLD has shared health history         Diagnosis    Procedures  Value: [id number] Code: CPT-4 UNLISTED E&M SERVICE    Medications Prescribed    No prescriptions ordered    Electronically signed by: Andrew Humphreys(NPI [id number])

## 2019-02-04 DIAGNOSIS — F32.A DEPRESSION, UNSPECIFIED DEPRESSION TYPE: ICD-10-CM

## 2019-02-07 ENCOUNTER — AMB VIDEO VISIT (OUTPATIENT)
Dept: URGENT CARE | Facility: CLINIC | Age: 44
End: 2019-02-07

## 2019-02-07 DIAGNOSIS — J01.40 ACUTE PANSINUSITIS, RECURRENCE NOT SPECIFIED: Primary | ICD-10-CM

## 2019-02-07 PROBLEM — R25.2 MUSCLE CRAMPS: Status: ACTIVE | Noted: 2017-05-30

## 2019-02-07 PROBLEM — R20.2 PARESTHESIAS: Status: ACTIVE | Noted: 2017-05-30

## 2019-02-07 PROBLEM — R61 NIGHT SWEATS: Status: ACTIVE | Noted: 2017-05-30

## 2019-02-07 PROBLEM — K06.8 BLEEDING GUMS: Status: ACTIVE | Noted: 2017-05-30

## 2019-02-07 PROBLEM — R31.29 MICROSCOPIC HEMATURIA: Status: ACTIVE | Noted: 2018-01-02

## 2019-02-07 PROBLEM — R53.83 FATIGUE: Status: ACTIVE | Noted: 2017-05-30

## 2019-02-07 RX ORDER — AMOXICILLIN AND CLAVULANATE POTASSIUM 875; 125 MG/1; MG/1
1 TABLET, FILM COATED ORAL EVERY 12 HOURS SCHEDULED
Qty: 20 TABLET | Refills: 0 | Status: SHIPPED | OUTPATIENT
Start: 2019-02-07 | End: 2019-02-17

## 2019-02-07 NOTE — PATIENT INSTRUCTIONS
1  Acute Sinusitis  - Augmentin prescribed, complete as directed   - use Flonase nasal spray  - take Tylenol or Motrin as needed  - run a humidifier at home and drink plenty of fluids  - if symptoms persist despite treatment or worsen, follow up w/ pcp for re-check

## 2019-02-07 NOTE — PROGRESS NOTES
[facility]        NAME: Lauren Wilson is a 41 y o  female  : 1975    MRN: [id number]  DATE: 2019  TIME: 6:44 PM    Assessment and Plan   Acute pansinusitis, recurrence not specified [J01 40]  1  Acute pansinusitis, recurrence not specified  amoxicillin-clavulanate (AUGMENTIN) 875-125 mg per tablet         Patient Instructions     Patient Instructions   1  Acute Sinusitis  - Augmentin prescribed, complete as directed   - use Flonase nasal spray  - take Tylenol or Motrin as needed  - run a humidifier at home and drink plenty of fluids  - if symptoms persist despite treatment or worsen, follow up w/ pcp for re-check     Follow up with PCP in 3-5 days  Proceed to  ER if symptoms worsen  Chief Complaint     Chief Complaint   Patient presents with    Cold Like Symptoms         History of Present Illness       42 yo female presents c/o headache, sinus pressure, face/teeth pain, nasal congestion, and low grade temperature around 100 0  Symptoms have been present for almost 3 weeks  She states it began as a cold and has now settled into her sinuses  No fever/chills or body aches  No cough or wheezing  No chest pain or SOB  No GI sx  No skin rashes  She has been using Mucinex and Allegra-D as needed with minimal temporary relief  PMHx: anxiety  Rx: Zoloft   Allergies: sulfa  Pregnancy: no   BF: no   Smoker: no         Review of Systems   Review of Systems   Constitutional: Negative  HENT:        As noted in HPI   Eyes: Negative  Respiratory: Negative  Cardiovascular: Negative  Gastrointestinal: Negative  Musculoskeletal: Negative  Skin: Negative  Neurological: Negative            Current Medications       Current Outpatient Prescriptions:     amoxicillin-clavulanate (AUGMENTIN) 875-125 mg per tablet, Take 1 tablet by mouth every 12 (twelve) hours for 10 days, Disp: 20 tablet, Rfl: 0    sertraline (ZOLOFT) 50 mg tablet, Take 1 5 tablets (75 mg total) by mouth daily, Disp: 45 tablet, Rfl: 3    Current Allergies     Allergies as of 02/07/2019 - Reviewed 02/07/2019   Allergen Reaction Noted    Sulfa antibiotics Hives             The following portions of the patient's history were reviewed and updated as appropriate: allergies, current medications, past family history, past medical history, past social history, past surgical history and problem list      No past medical history on file  No past surgical history on file  No family history on file  Medications have been verified  Objective   There were no vitals taken for this visit  Physical Exam     Physical Exam   Constitutional: She is oriented to person, place, and time  She appears well-developed and well-nourished  She is active and cooperative  Non-toxic appearance  She does not have a sickly appearance  She does not appear ill  No distress  HENT:   Nose: Right sinus exhibits maxillary sinus tenderness and frontal sinus tenderness  Left sinus exhibits maxillary sinus tenderness and frontal sinus tenderness  Neurological: She is alert and oriented to person, place, and time  Skin: She is not diaphoretic  Psychiatric: She has a normal mood and affect   Her behavior is normal  Judgment and thought content normal

## 2019-03-01 NOTE — CARE ANYWHERE EVISITS
Visit Summary for YASMIN GOLD - Gender: Female - Date of Birth: [id number]  Date: [id number] - Duration: 4 minutes  Patient: YASMIN GOLD  Provider: Silvana Astorga    Patient Contact Information  Address  [place] [address]  [phone]    Visit Topics  Cold [Added By: Self - 2019-02-07]  Fever [Added By: Self - 2019-02-07]  Headache [Added By: Self - 2019-02-07]    Conversation Transcripts     [Notification] You are connected with Silvana Astorga, Urgent Care Specialist  [Notification] Ashley Clark is located in [place]  [Notification] YASMIN GOLD has shared health history         Diagnosis    Procedures  Value: [id number] Code: CPT-4 UNLISTED E&M SERVICE    Medications Prescribed    No prescriptions ordered    Electronically signed by: Sana Hardin(NPI [id number])

## 2019-04-15 ENCOUNTER — OFFICE VISIT (OUTPATIENT)
Dept: FAMILY MEDICINE CLINIC | Facility: OTHER | Age: 44
End: 2019-04-15
Payer: COMMERCIAL

## 2019-04-15 VITALS
SYSTOLIC BLOOD PRESSURE: 102 MMHG | TEMPERATURE: 98.6 F | BODY MASS INDEX: 19.86 KG/M2 | HEART RATE: 82 BPM | WEIGHT: 123.6 LBS | HEIGHT: 66 IN | DIASTOLIC BLOOD PRESSURE: 78 MMHG | OXYGEN SATURATION: 98 %

## 2019-04-15 DIAGNOSIS — F51.01 PRIMARY INSOMNIA: Primary | ICD-10-CM

## 2019-04-15 DIAGNOSIS — F41.9 ANXIETY: ICD-10-CM

## 2019-04-15 DIAGNOSIS — Z12.39 SCREENING FOR BREAST CANCER: ICD-10-CM

## 2019-04-15 PROCEDURE — 99214 OFFICE O/P EST MOD 30 MIN: CPT | Performed by: NURSE PRACTITIONER

## 2019-04-15 PROCEDURE — 3008F BODY MASS INDEX DOCD: CPT | Performed by: NURSE PRACTITIONER

## 2019-04-15 RX ORDER — ZOLPIDEM TARTRATE 6.25 MG/1
6.25 TABLET, FILM COATED, EXTENDED RELEASE ORAL
Qty: 30 TABLET | Refills: 0 | Status: SHIPPED | OUTPATIENT
Start: 2019-04-15 | End: 2020-12-31 | Stop reason: ALTCHOICE

## 2019-05-07 ENCOUNTER — TELEPHONE (OUTPATIENT)
Dept: FAMILY MEDICINE CLINIC | Facility: OTHER | Age: 44
End: 2019-05-07

## 2019-07-19 DIAGNOSIS — F32.A DEPRESSION, UNSPECIFIED DEPRESSION TYPE: ICD-10-CM

## 2019-08-20 DIAGNOSIS — F32.A DEPRESSION, UNSPECIFIED DEPRESSION TYPE: ICD-10-CM

## 2019-11-14 ENCOUNTER — AMB VIDEO VISIT (OUTPATIENT)
Dept: URGENT CARE | Facility: MEDICAL CENTER | Age: 44
End: 2019-11-14
Payer: COMMERCIAL

## 2019-11-14 DIAGNOSIS — N39.0 URINARY TRACT INFECTION WITHOUT HEMATURIA, SITE UNSPECIFIED: Primary | ICD-10-CM

## 2019-11-14 PROCEDURE — 99212 OFFICE O/P EST SF 10 MIN: CPT | Performed by: FAMILY MEDICINE

## 2019-11-14 RX ORDER — NITROFURANTOIN 25; 75 MG/1; MG/1
100 CAPSULE ORAL 2 TIMES DAILY
Qty: 14 CAPSULE | Refills: 0 | Status: SHIPPED | OUTPATIENT
Start: 2019-11-14 | End: 2019-11-21

## 2019-11-15 ENCOUNTER — AMB VIDEO VISIT (OUTPATIENT)
Dept: OTHER | Facility: HOSPITAL | Age: 44
End: 2019-11-15

## 2019-11-15 NOTE — PROGRESS NOTES
[facility]        NAME: Gualberto Canales is a 45 y o  female  : 1975    MRN: [id number]  DATE: 2019  TIME: 7:23 PM    Assessment and Plan   Urinary tract infection without hematuria, site unspecified [N39 0]  1  Urinary tract infection without hematuria, site unspecified  nitrofurantoin (MACROBID) 100 mg capsule         Patient Instructions       Follow up with PCP in 3-5 days  Proceed to  ER if symptoms worsen  Chief Complaint   No chief complaint on file  History of Present Illness       E visit  Patient with UTI symptoms that began earlier today  Complaining of some dysuria mi frequency  She did and over-the-counter strep test today before calling which was tested positive  Otherwise, denies fever or chills  No recent UTI  Review of Systems   Review of Systems   Genitourinary: Positive for dysuria and frequency           Current Medications       Current Outpatient Medications:     nitrofurantoin (MACROBID) 100 mg capsule, Take 1 capsule (100 mg total) by mouth 2 (two) times a day for 7 days, Disp: 14 capsule, Rfl: 0    sertraline (ZOLOFT) 50 mg tablet, TAKE 1 AND 1/2 TABLETS(75 MG) BY MOUTH DAILY, Disp: 45 tablet, Rfl: 3    zolpidem (AMBIEN CR) 6 25 MG CR tablet, Take 1 tablet (6 25 mg total) by mouth daily at bedtime as needed for sleep, Disp: 30 tablet, Rfl: 0    Current Allergies     Allergies as of 2019 - Reviewed 04/15/2019   Allergen Reaction Noted    Sulfa antibiotics Hives             The following portions of the patient's history were reviewed and updated as appropriate: allergies, current medications, past family history, past medical history, past social history, past surgical history and problem list      Past Medical History:   Diagnosis Date    Bleeding gums     Last assessed 2017     Elbow fracture     History of x-ray of elbow fracture    Fatigue     Last assessed 2017     Fractured coccyx ([address] )     Resolved 2015     Microscopic hematuria     Last assessed 1/2/2018     Night sweats     Last assesed 5/30/2017     Paresthesias     Last assessed 5/30/2017     Post concussion syndrome     Resolved 9/7/2015     Wrist fracture     History of x-ray wrist fracture        No past surgical history on file  Family History   Problem Relation Age of Onset    Spina bifida Mother     Diabetes Maternal Grandfather     Prostate cancer Maternal Grandfather          Medications have been verified  Objective   There were no vitals taken for this visit         Physical Exam     Physical Exam

## 2019-11-15 NOTE — CARE ANYWHERE EVISITS
Visit Summary for YASMIN GOLD - Gender: Female - Date of Birth: [id number]  Date: [id number] - Duration: 2 minutes  Patient: YASMIN GOLD  Provider: Allen Perez    Patient Contact Information  Address  [place] [address]  [phone]    Visit Topics  UTI [Added By: Self - 2019-11-15]    Conversation Transcripts  [0A][0A] [Notification] You are connected with Allen Perez, Family Physician [0A][Notification] [facility] is located in [place]  [0A][Notification] YASMIN GOLD has shared health history  Tasha Velez  [0A]    Diagnosis    Procedures  Value: 84142 Code: CPT-4 UNLISTED E&M SERVICE    Medications Prescribed    No prescriptions ordered    Electronically signed by: Andrew Brandt(NPI [id number])

## 2019-11-15 NOTE — PATIENT INSTRUCTIONS
Patient placed empirically on Macrobid 100 mg b i d  For 7 days  Advised patient to follow up with PCP in 2 days if symptoms persist or worsen  She expressed understanding

## 2019-12-23 DIAGNOSIS — F32.A DEPRESSION, UNSPECIFIED DEPRESSION TYPE: ICD-10-CM

## 2020-02-17 ENCOUNTER — OFFICE VISIT (OUTPATIENT)
Dept: FAMILY MEDICINE CLINIC | Facility: OTHER | Age: 45
End: 2020-02-17
Payer: COMMERCIAL

## 2020-02-17 VITALS
SYSTOLIC BLOOD PRESSURE: 100 MMHG | HEIGHT: 66 IN | HEART RATE: 84 BPM | TEMPERATURE: 99 F | WEIGHT: 123 LBS | DIASTOLIC BLOOD PRESSURE: 62 MMHG | BODY MASS INDEX: 19.77 KG/M2 | OXYGEN SATURATION: 96 %

## 2020-02-17 DIAGNOSIS — R42 DIZZINESS: ICD-10-CM

## 2020-02-17 DIAGNOSIS — R53.83 OTHER FATIGUE: ICD-10-CM

## 2020-02-17 DIAGNOSIS — R20.2 PARESTHESIAS: Primary | ICD-10-CM

## 2020-02-17 DIAGNOSIS — F41.9 ANXIETY: ICD-10-CM

## 2020-02-17 PROBLEM — J01.40 ACUTE PANSINUSITIS: Status: RESOLVED | Noted: 2019-02-07 | Resolved: 2020-02-17

## 2020-02-17 PROBLEM — R25.2 MUSCLE CRAMPS: Status: RESOLVED | Noted: 2017-05-30 | Resolved: 2020-02-17

## 2020-02-17 PROBLEM — R61 NIGHT SWEATS: Status: RESOLVED | Noted: 2017-05-30 | Resolved: 2020-02-17

## 2020-02-17 PROBLEM — K06.8 BLEEDING GUMS: Status: RESOLVED | Noted: 2017-05-30 | Resolved: 2020-02-17

## 2020-02-17 PROCEDURE — 3008F BODY MASS INDEX DOCD: CPT | Performed by: FAMILY MEDICINE

## 2020-02-17 PROCEDURE — 3008F BODY MASS INDEX DOCD: CPT | Performed by: NURSE PRACTITIONER

## 2020-02-17 PROCEDURE — 99214 OFFICE O/P EST MOD 30 MIN: CPT | Performed by: NURSE PRACTITIONER

## 2020-02-17 NOTE — PROGRESS NOTES
Assessment/Plan:         Problem List Items Addressed This Visit     Fatigue  Dizziness  Paresthesias  --No readily apparent cause of her symptoms including anxiety, insomnia, dehydration, migraines, hypoglycemia, other  --Will check screening labs  --RTO 2 weeks for review of results  Call for new/worsening symptoms in the interim  --Good sleep hygiene, stay well hydrated    Relevant Orders    CBC and differential    Comprehensive metabolic panel    Ferritin    Iron Panel (Includes Ferritin, Iron Sat%, Iron, and TIBC)    TSH, 3rd generation with Free T4 reflex    UA (URINE) with reflex to Scope    Cortisol Level, AM Specimen    hCG, quantitative    Hemoglobin A1C    Vitamin B12    Vitamin D 25 hydroxy            Subjective:      Patient ID: Jatin Brock is a 45 y o  female  Here with complaints of feeling fatigued, with intermittent dizziness, intermittent numbness/tingling of fingers and toes x 2 weeks  Fatigue is present most of the time  Baseline intermittent insomnia--no worse  Never took Ambien  Sometimes refreshed in the morning but not always  No known apnea  Dizziness is lightheaded feeling, sometimes felt when first standing, but not always  No direct correlation with position changes  Does not feel like vertigo  No associated headaches, but feels mild "pressure" in head at times  No associated photophobia, vision changes  Baseline intermittent nausea, no recent worsening  No vomiting  Symptoms do not seem to be related to stress/anxiety which has not increased appreciably lately  Denies panic attacks, hyperventilation  Stopped Zoloft 2 months ago and feels like she is doing fine without it  No correlation with eating/not eating  No associated tremors  Does not feel like low blood sugar  No recent appetite decrease, change in diet  1 cup of coffee per day  Denies dehydration, dark urine  No associated fevers, night sweats, weight loss     Normal periods--denies heavy, irregular  Last was a month ago  No numbness/tingling of face  No weakness, tremors, vision changes  No recent bowel or bladder changes including abdominal pain, C/D, melena, hematochezia, dysuria, frequency, hematuria  No rashes  No body aches, joint pains  No recent UTI symptoms, including sore throat  No allergies  States that "low blood pressures" as well as "low sodium" tends to run in her family  Rare social alcohol  But finds lately that when she has a glass of wine it makes her quite sleepy which it didn't seem to do in the past          The following portions of the patient's history were reviewed and updated as appropriate: current medications, past family history, past medical history, past social history, past surgical history and problem list     Review of Systems   Constitutional: Positive for fatigue  Negative for fever and unexpected weight change  HENT: Negative for sore throat  Eyes: Negative for photophobia and visual disturbance  Respiratory: Negative for cough  Cardiovascular: Negative for chest pain and palpitations  Gastrointestinal: Positive for nausea  Negative for abdominal pain, blood in stool, constipation, diarrhea and vomiting  Genitourinary: Negative for difficulty urinating  Musculoskeletal: Negative for arthralgias and myalgias  Skin: Negative for rash  Neurological: Positive for dizziness, light-headedness and numbness  Negative for tremors, seizures, syncope, speech difficulty, weakness and headaches  Hematological: Negative for adenopathy  Psychiatric/Behavioral: Negative for confusion  The patient is not nervous/anxious  Objective:      /62 (BP Location: Left arm, Patient Position: Sitting, Cuff Size: Adult)   Pulse 84   Temp 99 °F (37 2 °C) (Tympanic)   Ht 5' 6" (1 676 m)   Wt 55 8 kg (123 lb)   SpO2 96%   BMI 19 85 kg/m²          Physical Exam   Constitutional: She is oriented to person, place, and time   She appears well-developed and well-nourished  HENT:   Head: Normocephalic  Right Ear: External ear normal    Left Ear: External ear normal    Nose: Nose normal    Mouth/Throat: Oropharynx is clear and moist    Eyes: Pupils are equal, round, and reactive to light  Conjunctivae are normal    Neck: Neck supple  No thyromegaly present  Cardiovascular: Normal rate, regular rhythm and normal heart sounds  No murmur heard  Pulmonary/Chest: Effort normal and breath sounds normal  She has no wheezes  She has no rales  Abdominal: Soft  Bowel sounds are normal  There is no tenderness  Lymphadenopathy:     She has no cervical adenopathy  Neurological: She is alert and oriented to person, place, and time  She has normal reflexes  Skin: Skin is warm and dry  Psychiatric: She has a normal mood and affect

## 2020-02-18 ENCOUNTER — APPOINTMENT (OUTPATIENT)
Dept: LAB | Facility: CLINIC | Age: 45
End: 2020-02-18
Payer: COMMERCIAL

## 2020-02-18 DIAGNOSIS — R53.83 OTHER FATIGUE: ICD-10-CM

## 2020-02-18 DIAGNOSIS — R42 DIZZINESS: ICD-10-CM

## 2020-02-18 DIAGNOSIS — R20.2 PARESTHESIAS: ICD-10-CM

## 2020-02-18 LAB
25(OH)D3 SERPL-MCNC: 22.3 NG/ML (ref 30–100)
ALBUMIN SERPL BCP-MCNC: 3.8 G/DL (ref 3.5–5)
ALP SERPL-CCNC: 51 U/L (ref 46–116)
ALT SERPL W P-5'-P-CCNC: 20 U/L (ref 12–78)
ANION GAP SERPL CALCULATED.3IONS-SCNC: 8 MMOL/L (ref 4–13)
AST SERPL W P-5'-P-CCNC: 13 U/L (ref 5–45)
B-HCG SERPL-ACNC: <2 MIU/ML
BACTERIA UR QL AUTO: NORMAL /HPF
BASOPHILS # BLD AUTO: 0.08 THOUSANDS/ΜL (ref 0–0.1)
BASOPHILS NFR BLD AUTO: 1 % (ref 0–1)
BILIRUB SERPL-MCNC: 1.84 MG/DL (ref 0.2–1)
BILIRUB UR QL STRIP: NEGATIVE
BUN SERPL-MCNC: 14 MG/DL (ref 5–25)
CALCIUM SERPL-MCNC: 8.5 MG/DL (ref 8.3–10.1)
CHLORIDE SERPL-SCNC: 105 MMOL/L (ref 100–108)
CLARITY UR: CLEAR
CO2 SERPL-SCNC: 27 MMOL/L (ref 21–32)
COLOR UR: YELLOW
CORTIS AM PEAK SERPL-MCNC: 14 UG/DL (ref 4.2–22.4)
CREAT SERPL-MCNC: 0.8 MG/DL (ref 0.6–1.3)
EOSINOPHIL # BLD AUTO: 0.56 THOUSAND/ΜL (ref 0–0.61)
EOSINOPHIL NFR BLD AUTO: 9 % (ref 0–6)
ERYTHROCYTE [DISTWIDTH] IN BLOOD BY AUTOMATED COUNT: 12.5 % (ref 11.6–15.1)
EST. AVERAGE GLUCOSE BLD GHB EST-MCNC: 105 MG/DL
FERRITIN SERPL-MCNC: 71 NG/ML (ref 8–388)
GFR SERPL CREATININE-BSD FRML MDRD: 91 ML/MIN/1.73SQ M
GLUCOSE SERPL-MCNC: 86 MG/DL (ref 65–140)
GLUCOSE UR STRIP-MCNC: NEGATIVE MG/DL
HBA1C MFR BLD: 5.3 %
HCT VFR BLD AUTO: 38.7 % (ref 34.8–46.1)
HGB BLD-MCNC: 12.7 G/DL (ref 11.5–15.4)
HGB UR QL STRIP.AUTO: NEGATIVE
IMM GRANULOCYTES # BLD AUTO: 0.01 THOUSAND/UL (ref 0–0.2)
IMM GRANULOCYTES NFR BLD AUTO: 0 % (ref 0–2)
IRON SATN MFR SERPL: 43 %
IRON SERPL-MCNC: 169 UG/DL (ref 50–170)
KETONES UR STRIP-MCNC: NEGATIVE MG/DL
LEUKOCYTE ESTERASE UR QL STRIP: ABNORMAL
LYMPHOCYTES # BLD AUTO: 2.43 THOUSANDS/ΜL (ref 0.6–4.47)
LYMPHOCYTES NFR BLD AUTO: 37 % (ref 14–44)
MCH RBC QN AUTO: 31.9 PG (ref 26.8–34.3)
MCHC RBC AUTO-ENTMCNC: 32.8 G/DL (ref 31.4–37.4)
MCV RBC AUTO: 97 FL (ref 82–98)
MONOCYTES # BLD AUTO: 0.4 THOUSAND/ΜL (ref 0.17–1.22)
MONOCYTES NFR BLD AUTO: 6 % (ref 4–12)
NEUTROPHILS # BLD AUTO: 3.12 THOUSANDS/ΜL (ref 1.85–7.62)
NEUTS SEG NFR BLD AUTO: 47 % (ref 43–75)
NITRITE UR QL STRIP: NEGATIVE
NON-SQ EPI CELLS URNS QL MICRO: NORMAL /HPF
NRBC BLD AUTO-RTO: 0 /100 WBCS
PH UR STRIP.AUTO: 7.5 [PH]
PLATELET # BLD AUTO: 290 THOUSANDS/UL (ref 149–390)
PMV BLD AUTO: 10.5 FL (ref 8.9–12.7)
POTASSIUM SERPL-SCNC: 3.5 MMOL/L (ref 3.5–5.3)
PROT SERPL-MCNC: 7 G/DL (ref 6.4–8.2)
PROT UR STRIP-MCNC: NEGATIVE MG/DL
RBC # BLD AUTO: 3.98 MILLION/UL (ref 3.81–5.12)
RBC #/AREA URNS AUTO: NORMAL /HPF
SODIUM SERPL-SCNC: 140 MMOL/L (ref 136–145)
SP GR UR STRIP.AUTO: 1.01 (ref 1–1.03)
TIBC SERPL-MCNC: 393 UG/DL (ref 250–450)
TSH SERPL DL<=0.05 MIU/L-ACNC: 1.21 UIU/ML (ref 0.36–3.74)
UROBILINOGEN UR QL STRIP.AUTO: 0.2 E.U./DL
VIT B12 SERPL-MCNC: 477 PG/ML (ref 100–900)
WBC # BLD AUTO: 6.6 THOUSAND/UL (ref 4.31–10.16)
WBC #/AREA URNS AUTO: NORMAL /HPF

## 2020-02-18 PROCEDURE — 82728 ASSAY OF FERRITIN: CPT

## 2020-02-18 PROCEDURE — 83036 HEMOGLOBIN GLYCOSYLATED A1C: CPT

## 2020-02-18 PROCEDURE — 36415 COLL VENOUS BLD VENIPUNCTURE: CPT

## 2020-02-18 PROCEDURE — 84443 ASSAY THYROID STIM HORMONE: CPT

## 2020-02-18 PROCEDURE — 80053 COMPREHEN METABOLIC PANEL: CPT

## 2020-02-18 PROCEDURE — 85025 COMPLETE CBC W/AUTO DIFF WBC: CPT

## 2020-02-18 PROCEDURE — 82306 VITAMIN D 25 HYDROXY: CPT

## 2020-02-18 PROCEDURE — 82533 TOTAL CORTISOL: CPT

## 2020-02-18 PROCEDURE — 83540 ASSAY OF IRON: CPT

## 2020-02-18 PROCEDURE — 82607 VITAMIN B-12: CPT

## 2020-02-18 PROCEDURE — 81001 URINALYSIS AUTO W/SCOPE: CPT

## 2020-02-18 PROCEDURE — 83550 IRON BINDING TEST: CPT

## 2020-02-18 PROCEDURE — 84702 CHORIONIC GONADOTROPIN TEST: CPT

## 2020-02-21 ENCOUNTER — TELEPHONE (OUTPATIENT)
Dept: FAMILY MEDICINE CLINIC | Facility: OTHER | Age: 45
End: 2020-02-21

## 2020-02-21 ENCOUNTER — HOSPITAL ENCOUNTER (OUTPATIENT)
Dept: MAMMOGRAPHY | Facility: HOSPITAL | Age: 45
Discharge: HOME | End: 2020-02-21
Attending: NURSE PRACTITIONER
Payer: COMMERCIAL

## 2020-02-21 VITALS — WEIGHT: 123 LBS | BODY MASS INDEX: 19.77 KG/M2 | HEIGHT: 66 IN

## 2020-02-21 DIAGNOSIS — Z12.39 SCREENING FOR BREAST CANCER: ICD-10-CM

## 2020-02-21 DIAGNOSIS — E55.9 VITAMIN D DEFICIENCY: Primary | ICD-10-CM

## 2020-02-21 DIAGNOSIS — R17 TOTAL BILIRUBIN, ELEVATED: ICD-10-CM

## 2020-02-21 PROBLEM — R31.29 MICROSCOPIC HEMATURIA: Status: RESOLVED | Noted: 2018-01-02 | Resolved: 2020-02-21

## 2020-02-21 PROCEDURE — 77063 BREAST TOMOSYNTHESIS BI: CPT

## 2020-02-21 PROCEDURE — 77067 SCR MAMMO BI INCL CAD: CPT

## 2020-02-21 RX ORDER — ERGOCALCIFEROL 1.25 MG/1
50000 CAPSULE ORAL WEEKLY
Qty: 8 CAPSULE | Refills: 0 | Status: SHIPPED | OUTPATIENT
Start: 2020-02-21 | End: 2021-01-26

## 2020-02-28 ENCOUNTER — TELEPHONE (OUTPATIENT)
Dept: FAMILY MEDICINE CLINIC | Facility: OTHER | Age: 45
End: 2020-02-28

## 2020-02-28 NOTE — TELEPHONE ENCOUNTER
----- Message from William Ann, [address] sent at 2/28/2020 12:11 PM EST -----  Can we let Emy Anne know that her screening mammogram came back benign/normal, and that she should repeat in 1 year     Thanks

## 2020-03-16 DIAGNOSIS — R20.2 PARESTHESIAS: Primary | ICD-10-CM

## 2020-03-31 ENCOUNTER — TELEMEDICINE (OUTPATIENT)
Dept: FAMILY MEDICINE CLINIC | Facility: OTHER | Age: 45
End: 2020-03-31
Payer: COMMERCIAL

## 2020-03-31 DIAGNOSIS — F41.9 ANXIETY: Primary | ICD-10-CM

## 2020-03-31 DIAGNOSIS — F32.A DEPRESSION, UNSPECIFIED DEPRESSION TYPE: ICD-10-CM

## 2020-03-31 PROCEDURE — 99213 OFFICE O/P EST LOW 20 MIN: CPT | Performed by: NURSE PRACTITIONER

## 2020-03-31 RX ORDER — LORAZEPAM 0.5 MG/1
0.5 TABLET ORAL 2 TIMES DAILY PRN
Qty: 20 TABLET | Refills: 0 | Status: SHIPPED | OUTPATIENT
Start: 2020-03-31 | End: 2020-12-31 | Stop reason: ALTCHOICE

## 2020-03-31 NOTE — PROGRESS NOTES
Virtual Regular Visit    Problem List Items Addressed This Visit     Anxiety   --Various treatment options discussed  Wishes to restart SSRI which was previously helpful for her without AE's  In addition, will give small quantity of low dose benzo for PRN use only  Potential AE's discussed  PDMP queried and appropriate  --Regular exercise, other stress relieving measures encouraged  --Denies need for counsellor at this time  --Call for no improvement and/or med issues over the next 1-3 weeks  Relevant Medications  sertraline (ZOLOFT) 50 mg tablet QD    LORazepam (ATIVAN) 0 5 mg tablet BID prn  #20, 0RF                  Reason for visit is anxiety    Encounter provider GILES Fox    Provider located at [address] [place] [id number] [address] [place] [phone]      Recent Visits  No visits were found meeting these conditions  Showing recent visits within past 7 days and meeting all other requirements     Today's Visits  Date Type Provider Dept   03/31/20 Telemedicine GILES Fox [facility]   Showing today's visits and meeting all other requirements     Future Appointments  Date Type Provider Dept   03/31/20 Telemedicine GILES Fox [facility]   Showing future appointments within next 150 days and meeting all other requirements        The patient was identified by name and date of birth  Mary Duran was informed that this is a telemedicine visit and that the visit is being conducted through [facility] [place] and patient was informed that this is not a secure, HIPAA-complaint platform  she agrees to proceed     My office door was closed  No one else was in the room  She acknowledged consent and understanding of privacy and security of the video platform  The patient has agreed to participate and understands they can discontinue the visit at any time  Patient is aware this is a billable service  Subjective  Mary Duran is a 45 y o  female       Complaining of increased anxiety over the past few weeks, largely as the result of corona virus concerns  No other specific stressors noted  Wants to restart Zoloft which was helpful for her in the past, and she stopped last December because she seemed to be doing fine  No AE's  Has had two mild panic attacks recently marked by chest tightness, anxious thoughts  Lasted 5 minutes, and then she was able to calm herself down  No previous benzo use  Appetite OK  Some difficulty sleeping at times  Adequate support system  Denies home/family stressors  Denies avolition, anhedonia, feeling down/depressed to any significant degree  No thoughts of self/harm  1 cup of coffee per day  Occasional social alcohol (2 drinks per week)  No recreational drug use  Past Medical History:   Diagnosis Date    Bleeding gums     Last assessed 5/30/2017     Elbow fracture     History of x-ray of elbow fracture    Fatigue     Last assessed 5/30/2017     Fractured coccyx ([address] )     Resolved 9/7/2015     Microscopic hematuria     Last assessed 1/2/2018     Night sweats     Last assesed 5/30/2017     Paresthesias     Last assessed 5/30/2017     Post concussion syndrome     Resolved 9/7/2015     Wrist fracture     History of x-ray wrist fracture        No past surgical history on file  Current Outpatient Medications   Medication Sig Dispense Refill    ergocalciferol (VITAMIN D2) 50,000 units Take 1 capsule (50,000 Units total) by mouth once a week for 8 doses 8 capsule 0    LORazepam (ATIVAN) 0 5 mg tablet Take 1 tablet (0 5 mg total) by mouth 2 (two) times a day as needed for anxiety 20 tablet 0    sertraline (ZOLOFT) 50 mg tablet Take 1 tablet by mouth daily 30 tablet 3    zolpidem (AMBIEN CR) 6 25 MG CR tablet Take 1 tablet (6 25 mg total) by mouth daily at bedtime as needed for sleep (Patient not taking: Reported on 2/17/2020) 30 tablet 0     No current facility-administered medications for this visit  Allergies   Allergen Reactions    Sulfa Antibiotics Hives       Review of Systems   Respiratory: Positive for chest tightness  Psychiatric/Behavioral:        Per HPI       Physical Exam   Constitutional: She is oriented to person, place, and time  She appears well-developed  No distress  Neurological: She is alert and oriented to person, place, and time  Skin: She is not diaphoretic  Psychiatric: She has a normal mood and affect  Her behavior is normal  Judgment and thought content normal         I spent 20 minutes with the patient during this visit

## 2020-04-20 ENCOUNTER — TELEPHONE (OUTPATIENT)
Dept: NEUROLOGY | Facility: CLINIC | Age: 45
End: 2020-04-20

## 2020-05-28 ENCOUNTER — AMB VIDEO VISIT (OUTPATIENT)
Dept: URGENT CARE | Facility: CLINIC | Age: 45
End: 2020-05-28

## 2020-05-28 ENCOUNTER — AMB VIDEO VISIT (OUTPATIENT)
Dept: OTHER | Facility: HOSPITAL | Age: 45
End: 2020-05-28
Payer: COMMERCIAL

## 2020-05-28 DIAGNOSIS — R39.9 UTI SYMPTOMS: Primary | ICD-10-CM

## 2020-05-28 PROCEDURE — 99499 UNLISTED E&M SERVICE: CPT | Performed by: FAMILY MEDICINE

## 2020-05-28 PROCEDURE — 99212 OFFICE O/P EST SF 10 MIN: CPT | Performed by: FAMILY MEDICINE

## 2020-05-28 RX ORDER — NITROFURANTOIN 25; 75 MG/1; MG/1
100 CAPSULE ORAL 2 TIMES DAILY
Qty: 10 CAPSULE | Refills: 0 | Status: SHIPPED | OUTPATIENT
Start: 2020-05-28 | End: 2020-06-02

## 2020-07-16 ENCOUNTER — AMB VIDEO VISIT (OUTPATIENT)
Dept: OTHER | Facility: HOSPITAL | Age: 45
End: 2020-07-16
Payer: COMMERCIAL

## 2020-07-16 PROCEDURE — 99212 OFFICE O/P EST SF 10 MIN: CPT | Performed by: FAMILY MEDICINE

## 2020-07-16 NOTE — CARE ANYWHERE EVISITS
Visit Summary for YASMIN GOLD - Gender: Female - Date of Birth: [id number]  Date: [id number] - Duration: 6 minutes  Patient: YASMIN GOLD  Provider: Jennifer Harper    Patient Contact Information  Address  [place] [address]  [phone]    Visit Topics  Sinus infection [Added By: Self - 2020]    Triage Questions   What is your current physical address in the event of a medical emergency? Answer [address] [place], [place]]  Are you allergic to any medications? Answer Tabitha Joseph  Are you now or could you be pregnant? Answer [No]  Do you have any immune   system compromise or chronic lung disease? Answer [No]  Do you have any vulnerable family members in the home (infant, pregnant, cancer, elderly)? Answer [No]     Conversation Transcripts  [0A][0A] [Notification] You are connected with Jennifer Harpre, Family Physician [0A][Notification] [facility] is located in [place]  [0A][Notification] YASMIN GOLD has shared health history  Marcelino Cooper  [0A][Notification] Jennifer Harper has suggested a   follow-up visit  [0A][Notification] Jennifer Harper has added a diagnosis/procedure code  [0A][Notification] Jennifer Harper has added a prescription [0A][Notification] Jennifer Harper has added a prescription  [0A]    Diagnosis  Acute sinusitis, unspecified    Procedures    Medications Prescribed    Flonase Allergy Relief  Dose : 2 sprays  Route : nasal  Frequency : every day  Until directed to stop  Patient Instructions : each nostril  Refills : 0  Instructions to the Pharmacist : Substitutions allowed    amoxicillin  Dose : 1 tablet  Route : oral  Frequency : every 12 hours  Until directed to stop       Refills : 0  Instructions to the Pharmacist : Substitutions allowed      Provider Notes  [0A][0A] , confirmed[0A]:  [0A]mobile   TOPICS:[0A]Sinus infection[0A]began mon , worsening[0A]head cold, sinus pain + pressure worsening, severe nasal congestion in head, PND thick mucus - mild, no sore throat, no cough, no sob, no wheeze or chest   discomfort  no F, No chills, + sweats BA, rx; mucinex sinus pressure  no relief  [0A]no known covid exposure[0A]NAD, HA and sinus pressure w/ nasal congestion, worsening, severe RAD to behind eyes, teeth  severe, sore throat, ln ttp, breathing and   speaking comfortably[0A]ALLERGIES: sulfa[0A]DAILY MEDS/SUPPLEMENTS: sertraline[0A]PMH: sinusitis 1-2x/yr this feels similar  MILADYS[0A]works part time [employer], essential employee[0A]SHX:[0A]not pregnant or bf[0A]Diagnosis:[0A]sinusitis, acute: allergic vs   viral vs bacterial, most likely[0A]low risk covid[0A]Treatment:[0A]rest[0A]steam inhalation[0A]nasal saline as directed as needed[0A]salt gargles[0A]increase fluid intake[0A]humidifier[0A]flonase, 2 jetts each nostril in the am[0A]tylenol as directed as   needed for fever or body aches[0A]amoxicllin, as directed, please complete entire course[0A]Daily yogurt or probioitc for at least 3-6 months to replace the digestive bacteria lost to antibiotic use  [0A]ALL antibioitcs can increase your risk for a yeast   infection, may take any over the counter anti fungal regimen as directed as needed or may reconnect for other treatment options  [0A]Patient agrees to please follow up with your Primary Care Physician for full evaluation, and additional treatment if not   improving or worsening   [0A]Please reconnect or send a secure message anytime, as needed  [0A]Please send me a secure message (click the envelope on the right side of the screen) letting me know how you are doing in 3-4 days, or after the treatment has   been completed  [0A] [0A]Thank you for using [facility]  It was a pleasure speaking with you  I look forward to seeing you again for any future medical needs  [0A] [0A]Please print a copy of this note and send it to your regular doctor, or take it to   your next visit so it may be included in your medical record   [0A] [0A]If you need to speak to customer support, please call the following number: [0A][employer]: [phone]  [0A] [0A]And for pharmacy related issues, the number is   [phone]  [0A]Thanks for consulting with me, I hope you will be feeling better soon! ![0A]    Electronically signed by:  Ramila Nevarez(NPI [id number])

## 2020-07-24 DIAGNOSIS — F32.A DEPRESSION, UNSPECIFIED DEPRESSION TYPE: ICD-10-CM

## 2020-12-02 ENCOUNTER — AMB VIDEO VISIT (OUTPATIENT)
Dept: URGENT CARE | Facility: CLINIC | Age: 45
End: 2020-12-02

## 2020-12-02 DIAGNOSIS — N39.0 URINARY TRACT INFECTION WITHOUT HEMATURIA, SITE UNSPECIFIED: Primary | ICD-10-CM

## 2020-12-02 RX ORDER — NITROFURANTOIN 25; 75 MG/1; MG/1
100 CAPSULE ORAL 2 TIMES DAILY
Qty: 10 CAPSULE | Refills: 0 | Status: SHIPPED | OUTPATIENT
Start: 2020-12-02 | End: 2020-12-07

## 2020-12-07 DIAGNOSIS — F32.A DEPRESSION, UNSPECIFIED DEPRESSION TYPE: ICD-10-CM

## 2020-12-31 ENCOUNTER — OFFICE VISIT (OUTPATIENT)
Dept: OBGYN CLINIC | Facility: CLINIC | Age: 45
End: 2020-12-31
Payer: COMMERCIAL

## 2020-12-31 VITALS
WEIGHT: 124 LBS | DIASTOLIC BLOOD PRESSURE: 76 MMHG | HEIGHT: 66 IN | TEMPERATURE: 97.6 F | BODY MASS INDEX: 19.93 KG/M2 | SYSTOLIC BLOOD PRESSURE: 118 MMHG

## 2020-12-31 DIAGNOSIS — B37.3 CANDIDA VAGINITIS: Primary | ICD-10-CM

## 2020-12-31 PROCEDURE — 99203 OFFICE O/P NEW LOW 30 MIN: CPT | Performed by: OBSTETRICS & GYNECOLOGY

## 2020-12-31 PROCEDURE — 3008F BODY MASS INDEX DOCD: CPT | Performed by: OBSTETRICS & GYNECOLOGY

## 2021-01-12 DIAGNOSIS — F32.A DEPRESSION, UNSPECIFIED DEPRESSION TYPE: ICD-10-CM

## 2021-01-26 ENCOUNTER — TELEMEDICINE (OUTPATIENT)
Dept: FAMILY MEDICINE CLINIC | Facility: OTHER | Age: 46
End: 2021-01-26
Payer: COMMERCIAL

## 2021-01-26 DIAGNOSIS — F41.9 ANXIETY: Primary | ICD-10-CM

## 2021-01-26 DIAGNOSIS — Z12.31 ENCOUNTER FOR SCREENING MAMMOGRAM FOR BREAST CANCER: ICD-10-CM

## 2021-01-26 DIAGNOSIS — Z13.1 SCREENING FOR DIABETES MELLITUS: ICD-10-CM

## 2021-01-26 DIAGNOSIS — Z13.6 SCREENING FOR CARDIOVASCULAR CONDITION: ICD-10-CM

## 2021-01-26 DIAGNOSIS — E55.9 VITAMIN D DEFICIENCY: ICD-10-CM

## 2021-01-26 DIAGNOSIS — F32.A DEPRESSION, UNSPECIFIED DEPRESSION TYPE: ICD-10-CM

## 2021-01-26 PROCEDURE — 3725F SCREEN DEPRESSION PERFORMED: CPT | Performed by: FAMILY MEDICINE

## 2021-01-26 PROCEDURE — 99213 OFFICE O/P EST LOW 20 MIN: CPT | Performed by: FAMILY MEDICINE

## 2021-01-26 NOTE — PROGRESS NOTES
Assessment/Plan:    No problem-specific Assessment & Plan notes found for this encounter  Diagnoses and all orders for this visit:    Anxiety  -     Comprehensive metabolic panel; Future  -     TSH, 3rd generation with Free T4 reflex; Future  -     Vitamin D 25 hydroxy; Future    Depression, unspecified depression type  -     Comprehensive metabolic panel; Future  -     TSH, 3rd generation with Free T4 reflex; Future  -     Vitamin D 25 hydroxy; Future    Vitamin D deficiency  -     Vitamin D 25 hydroxy; Future    Screening for cardiovascular condition  -     Comprehensive metabolic panel; Future  -     Lipid panel; Future    Screening for diabetes mellitus  -     Comprehensive metabolic panel; Future    Encounter for screening mammogram for breast cancer  -     Mammo screening bilateral w cad; Future        38 yo female with h/o anxiety and depression presents for f/u  Continue sertraline at current 50 mg daily dose  Check labs (CMP, TSH, LIPID, Vit D) ahead of routine well visit  Return in about 3 months (around 4/26/2021) for Annual physical     The patient indicates understanding of these issues and agrees with the plan  Subjective:      Patient ID: Betzaida Gómez is a 39 y o  female      HPI   Pt presents for f/u to anxiety and depression  States that she has been feeling well  Good compliance and tolerance reported to sertraline 50 mg daily  No adverse reactions reported  Denies SI/HI  No recent labs completed      PHQ-9 Depression Screening    PHQ-9:   Frequency of the following problems over the past two weeks:      Little interest or pleasure in doing things: 0 - not at all  Feeling down, depressed, or hopeless: 1 - several days  PHQ-2 Score: 1           The following portions of the patient's history were reviewed and updated as appropriate: allergies, current medications, past family history, past medical history, past social history, past surgical history and problem list       Current Outpatient Medications:     sertraline (ZOLOFT) 50 mg tablet, Take 1 tablet (50 mg total) by mouth daily, Disp: 30 tablet, Rfl: 1      Review of Systems   Constitutional: Negative for activity change, fatigue and fever  HENT: Negative for congestion, ear pain, sinus pain and sore throat  Eyes: Negative for pain and itching  Respiratory: Negative for cough and shortness of breath  Cardiovascular: Negative for chest pain and palpitations  Gastrointestinal: Negative for abdominal pain, constipation, diarrhea, nausea and vomiting  Endocrine: Negative for cold intolerance and heat intolerance  Genitourinary: Negative for dysuria  Musculoskeletal: Negative for myalgias  Skin: Negative for color change and rash  Neurological: Negative for dizziness, syncope and headaches  Hematological: Negative for adenopathy  Psychiatric/Behavioral: Negative for behavioral problems, dysphoric mood and sleep disturbance  The patient is not nervous/anxious  Objective: There were no vitals taken for this visit  Physical Exam  Constitutional:       General: She is not in acute distress  Appearance: Normal appearance  She is well-developed  She is not ill-appearing  HENT:      Head: Normocephalic and atraumatic  Right Ear: External ear normal       Left Ear: External ear normal       Nose: Nose normal    Eyes:      General: No scleral icterus  Conjunctiva/sclera: Conjunctivae normal       Pupils: Pupils are equal, round, and reactive to light  Neck:      Musculoskeletal: Normal range of motion and neck supple  Thyroid: No thyromegaly  Cardiovascular:      Rate and Rhythm: Normal rate and regular rhythm  Heart sounds: Normal heart sounds  No murmur  Pulmonary:      Effort: Pulmonary effort is normal  No respiratory distress  Breath sounds: Normal breath sounds  No wheezing  Abdominal:      General: Bowel sounds are normal  There is no distension  Palpations: Abdomen is soft  Tenderness: There is no abdominal tenderness  Musculoskeletal: Normal range of motion  General: No tenderness  Lymphadenopathy:      Cervical: No cervical adenopathy  Skin:     General: Skin is warm and dry  Coloration: Skin is not jaundiced  Findings: No rash  Neurological:      Mental Status: She is alert and oriented to person, place, and time  Cranial Nerves: No cranial nerve deficit     Psychiatric:         Mood and Affect: Mood normal          Behavior: Behavior normal

## 2021-03-29 ENCOUNTER — VBI (OUTPATIENT)
Dept: ADMINISTRATIVE | Facility: OTHER | Age: 46
End: 2021-03-29

## 2021-03-31 DIAGNOSIS — Z23 ENCOUNTER FOR IMMUNIZATION: ICD-10-CM

## 2021-04-26 ENCOUNTER — TELEPHONE (OUTPATIENT)
Dept: NEUROLOGY | Facility: CLINIC | Age: 46
End: 2021-04-26

## 2021-05-04 DIAGNOSIS — F32.A DEPRESSION, UNSPECIFIED DEPRESSION TYPE: ICD-10-CM

## 2021-08-13 ENCOUNTER — OFFICE VISIT (OUTPATIENT)
Dept: FAMILY MEDICINE CLINIC | Facility: OTHER | Age: 46
End: 2021-08-13
Payer: COMMERCIAL

## 2021-08-13 VITALS
HEART RATE: 86 BPM | TEMPERATURE: 97.8 F | WEIGHT: 123 LBS | BODY MASS INDEX: 19.77 KG/M2 | SYSTOLIC BLOOD PRESSURE: 118 MMHG | DIASTOLIC BLOOD PRESSURE: 80 MMHG | HEIGHT: 66 IN | RESPIRATION RATE: 16 BRPM | OXYGEN SATURATION: 99 %

## 2021-08-13 DIAGNOSIS — Z11.4 SCREENING FOR HIV (HUMAN IMMUNODEFICIENCY VIRUS): ICD-10-CM

## 2021-08-13 DIAGNOSIS — Z00.00 ANNUAL PHYSICAL EXAM: Primary | ICD-10-CM

## 2021-08-13 DIAGNOSIS — Z11.59 NEED FOR HEPATITIS C SCREENING TEST: ICD-10-CM

## 2021-08-13 DIAGNOSIS — F41.9 ANXIETY: ICD-10-CM

## 2021-08-13 PROCEDURE — 4004F PT TOBACCO SCREEN RCVD TLK: CPT | Performed by: FAMILY MEDICINE

## 2021-08-13 PROCEDURE — 3725F SCREEN DEPRESSION PERFORMED: CPT | Performed by: FAMILY MEDICINE

## 2021-08-13 PROCEDURE — 3008F BODY MASS INDEX DOCD: CPT | Performed by: FAMILY MEDICINE

## 2021-08-13 PROCEDURE — 99396 PREV VISIT EST AGE 40-64: CPT | Performed by: FAMILY MEDICINE

## 2021-08-13 RX ORDER — SERTRALINE HYDROCHLORIDE 100 MG/1
100 TABLET, FILM COATED ORAL DAILY
Qty: 90 TABLET | Refills: 3 | Status: SHIPPED | OUTPATIENT
Start: 2021-08-13 | End: 2022-07-15 | Stop reason: SINTOL

## 2021-08-13 NOTE — ASSESSMENT & PLAN NOTE
- Increased dose of Zoloft from 75mg to 100mg daily for ongoing worsened anxiety and associated sleep disturbance     - follow up in 6 months

## 2021-08-13 NOTE — PATIENT INSTRUCTIONS
Wellness Visit for Adults   AMBULATORY CARE:   A wellness visit  is when you see your healthcare provider to get screened for health problems  Your healthcare provider will also give you advice on how to stay healthy  Write down your questions so you remember to ask them  Ask your healthcare provider how often you should have a wellness visit  What happens at a wellness visit:  Your healthcare provider will ask about your health, and your family history of health problems  This includes high blood pressure, heart disease, and cancer  He or she will ask if you have symptoms that concern you, if you smoke, and about your mood  You may also be asked about your intake of medicines, supplements, food, and alcohol  Any of the following may be done:  · Your weight  will be checked  Your height may also be checked so your body mass index (BMI) can be calculated  Your BMI shows if you are at a healthy weight  · Your blood pressure  and heart rate will be checked  Your temperature may also be checked  · Blood and urine tests  may be done  Blood tests may be done to check your cholesterol levels  Abnormal cholesterol levels increase your risk for heart disease and stroke  You may also need a blood or urine test to check for diabetes if you are at increased risk  Urine tests may be done to look for signs of an infection or kidney disease  · A physical exam  includes checking your heartbeat and lungs with a stethoscope  Your healthcare provider may also check your skin to look for sun damage  · Screening tests  may be recommended  A screening test is done to check for diseases that may not cause symptoms  The screening tests you may need depend on your age, gender, family history, and lifestyle habits  For example, colorectal screening may be recommended if you are 48years old or older  Screening tests you need if you are a woman:   · A Pap smear  is used to screen for cervical cancer   Pap smears are usually done every 3 to 5 years depending on your age  You may need them more often if you have had abnormal Pap smear test results in the past  Ask your healthcare provider how often you should have a Pap smear  · A mammogram  is an x-ray of your breasts to screen for breast cancer  Experts recommend mammograms every 2 years starting at age 48 years  You may need a mammogram at age 52 years or younger if you have an increased risk for breast cancer  Talk to your healthcare provider about when you should start having mammograms and how often you need them  Vaccines you may need:   · Get an influenza vaccine  every year  The influenza vaccine protects you from the flu  Several types of viruses cause the flu  The viruses change over time, so new vaccines are made each year  · Get a tetanus-diphtheria (Td) booster vaccine  every 10 years  This vaccine protects you against tetanus and diphtheria  Tetanus is a severe infection that may cause painful muscle spasms and lockjaw  Diphtheria is a severe bacterial infection that causes a thick covering in the back of your mouth and throat  · Get a human papillomavirus (HPV) vaccine  if you are female and aged 23 to 32 or male 23 to 24 and never received it  This vaccine protects you from HPV infection  HPV is the most common infection spread by sexual contact  HPV may also cause vaginal, penile, and anal cancers  · Get a pneumococcal vaccine  if you are aged 72 years or older  The pneumococcal vaccine is an injection given to protect you from pneumococcal disease  Pneumococcal disease is an infection caused by pneumococcal bacteria  The infection may cause pneumonia, meningitis, or an ear infection  · Get a shingles vaccine  if you are 60 or older, even if you have had shingles before  The shingles vaccine is an injection to protect you from the varicella-zoster virus  This is the same virus that causes chickenpox   Shingles is a painful rash that develops in people who had chickenpox or have been exposed to the virus  How to eat healthy:  My Plate is a model for planning healthy meals  It shows the types and amounts of foods that should go on your plate  Fruits and vegetables make up about half of your plate, and grains and protein make up the other half  A serving of dairy is included on the side of your plate  The amount of calories and serving sizes you need depends on your age, gender, weight, and height  Examples of healthy foods are listed below:  · Eat a variety of vegetables  such as dark green, red, and orange vegetables  You can also include canned vegetables low in sodium (salt) and frozen vegetables without added butter or sauces  · Eat a variety of fresh fruits , canned fruit in 100% juice, frozen fruit, and dried fruit  · Include whole grains  At least half of the grains you eat should be whole grains  Examples include whole-wheat bread, wheat pasta, brown rice, and whole-grain cereals such as oatmeal     · Eat a variety of protein foods such as seafood (fish and shellfish), lean meat, and poultry without skin (turkey and chicken)  Examples of lean meats include pork leg, shoulder, or tenderloin, and beef round, sirloin, tenderloin, and extra lean ground beef  Other protein foods include eggs and egg substitutes, beans, peas, soy products, nuts, and seeds  · Choose low-fat dairy products such as skim or 1% milk or low-fat yogurt, cheese, and cottage cheese  · Limit unhealthy fats  such as butter, hard margarine, and shortening  Exercise:  Exercise at least 30 minutes per day on most days of the week  Some examples of exercise include walking, biking, dancing, and swimming  You can also fit in more physical activity by taking the stairs instead of the elevator or parking farther away from stores  Include muscle strengthening activities 2 days each week  Regular exercise provides many health benefits   It helps you manage your weight, and decreases your risk for type 2 diabetes, heart disease, stroke, and high blood pressure  Exercise can also help improve your mood  Ask your healthcare provider about the best exercise plan for you  General health and safety guidelines:   · Do not smoke  Nicotine and other chemicals in cigarettes and cigars can cause lung damage  Ask your healthcare provider for information if you currently smoke and need help to quit  E-cigarettes or smokeless tobacco still contain nicotine  Talk to your healthcare provider before you use these products  · Limit alcohol  A drink of alcohol is 12 ounces of beer, 5 ounces of wine, or 1½ ounces of liquor  · Lose weight, if needed  Being overweight increases your risk of certain health conditions  These include heart disease, high blood pressure, type 2 diabetes, and certain types of cancer  · Protect your skin  Do not sunbathe or use tanning beds  Use sunscreen with a SPF 15 or higher  Apply sunscreen at least 15 minutes before you go outside  Reapply sunscreen every 2 hours  Wear protective clothing, hats, and sunglasses when you are outside  · Drive safely  Always wear your seatbelt  Make sure everyone in your car wears a seatbelt  A seatbelt can save your life if you are in an accident  Do not use your cell phone when you are driving  This could distract you and cause an accident  Pull over if you need to make a call or send a text message  · Practice safe sex  Use latex condoms if are sexually active and have more than one partner  Your healthcare provider may recommend screening tests for sexually transmitted infections (STIs)  · Wear helmets, lifejackets, and protective gear  Always wear a helmet when you ride a bike or motorcycle, go skiing, or play sports that could cause a head injury  Wear protective equipment when you play sports  Wear a lifejacket when you are on a boat or doing water sports      © Copyright [facility] 2021 Information is for End User's use only and may not be sold, redistributed or otherwise used for commercial purposes  All illustrations and images included in CareNotes® are the copyrighted property of A D A M [employer] Inc  or Carlie Fontenot  The above information is an  only  It is not intended as medical advice for individual conditions or treatments  Talk to your doctor, nurse or pharmacist before following any medical regimen to see if it is safe and effective for you  Cigarette Smoking and Your Health   AMBULATORY CARE:   Risks to your health if you smoke:  Nicotine and other chemicals found in tobacco and e-cigarettes can damage every cell in your body  Even if you are a light smoker, you have an increased risk for cancer, heart disease, and lung disease  If you are pregnant or have diabetes, smoking increases your risk for complications  Nicotine can affect an adolescent's developing brain  This can lead to trouble thinking, learning, or paying attention  Benefits to your health if you stop smoking:   · You decrease respiratory symptoms such as coughing, wheezing, and shortness of breath  · You reduce your risk for cancers of the lung, mouth, throat, kidney, bladder, pancreas, stomach, and cervix  If you already have cancer, you increase the benefits of chemotherapy  You also reduce your risk for cancer returning or a second cancer from developing  · You reduce your risk for heart disease, blood clots, heart attack, and stroke  · You reduce your risk for lung infections, and diseases such as pneumonia, asthma, chronic bronchitis, and emphysema  · Your circulation improves  More oxygen can be delivered to your body  If you have diabetes, you lower your risk for complications, such as kidney, artery, and eye diseases  You also lower your risk for nerve damage  Nerve damage can lead to amputations, poor vision, and blindness      · You improve your body's ability to heal and to fight infections  · An adolescent can help his or her brain and body develop in a healthy way  Talk to your adolescent about all the health risks of nicotine  If you can, start talking about nicotine when your child is younger than 12 years  This may make it easier for him or her not to start using nicotine as a teenager or adult  Explain to him or her that it is best never to start  It can be hard to try to quit later  Benefits to the health of others if you stop smoking:  Tobacco is harmful to nonsmokers who breathe in your secondhand smoke  The following are ways the health of others around you may improve when you stop smoking:  · You lower the risks for lung cancer and heart disease in nonsmoking adults  · If you are pregnant, you lower the risk for miscarriage, early delivery, low birth weight, and stillbirth  You also lower your baby's risk for SIDS, obesity, developmental delay, and neurobehavioral problems, such as ADHD  · If you have children, you lower their risk for ear infections, colds, pneumonia, bronchitis, and asthma  Follow up with your doctor as directed:  Write down your questions so you remember to ask them during your visits  For support and more information:   · American [employer] Association  [address] [place] [address],[address]  [address]  Phone: [facility]  Phone: 8- 944 - [phone]  Web Address: [employer]    · Smokefree  gov  Phone: 1- 210 - 966-9267  Web Address: www smokefree  [facility] [address] 2021 Information is for End User's use only and may not be sold, redistributed or otherwise used for commercial purposes  All illustrations and images included in CareNotes® are the copyrighted property of [employer]  or [address] [place] [address]  The above information is an  only  It is not intended as medical advice for individual conditions or treatments   Talk to your doctor, nurse or pharmacist before following any medical regimen to see if it is safe and effective for you

## 2021-08-13 NOTE — PROGRESS NOTES
[address]    NAME: Purnima Ortiz  AGE: 42 y o  SEX: female  : 1975     DATE: 2021     Assessment and Plan:     Problem List Items Addressed This Visit        Other    Anxiety     - Increased dose of Zoloft from 75mg to 100mg daily for ongoing worsened anxiety and associated sleep disturbance  - follow up in 6 months         Relevant Medications    sertraline (ZOLOFT) 100 mg tablet      Other Visit Diagnoses     Annual physical exam    -  Primary    Need for hepatitis C screening test        Relevant Orders    Hepatitis C antibody    Screening for HIV (human immunodeficiency virus)        Relevant Orders    HIV 1/2 Antigen/Antibody (4th Generation) w Reflex [facility]          Immunizations and preventive care screenings were discussed with patient today  Appropriate education was printed on patient's after visit summary  Counseling:  Alcohol/drug use: discussed moderation in alcohol intake, the recommendations for healthy alcohol use, and avoidance of illicit drug use  Dental Health: discussed importance of regular tooth brushing, flossing, and dental visits  Injury prevention: discussed safety/seat belts, safety helmets, smoke detectors, carbon dioxide detectors, and smoking near bedding or upholstery  Sexual health: discussed sexually transmitted diseases, partner selection, use of condoms, avoidance of unintended pregnancy, and contraceptive alternatives  · Exercise: the importance of regular exercise/physical activity was discussed  Recommend exercise 3-5 times per week for at least 30 minutes  Tobacco Cessation Counseling: Tobacco cessation counseling was provided  The patient is sincerely urged to quit consumption of tobacco  She is ready to quit tobacco  Medication options discussed  Patient refused medication       Nutrition Assessment and Intervention:     Nutrition patient handout reviewed with patient Other interventions: Current diet regimen reviewed with patient, and recommendations based on current guidelines were discussed  Handout was provided  Physical Activity Assessment and Intervention:    Physical Activity patient handout reviewed with patient      Other interventions: Current exercise regimen reviewed with patient, and recommendations based on current guidelines were discussed  Handout was provided  Emotional and Mental Well-being, Sleep, Connectedness Assessment and Intervention:    Sleep/stress assessment performed    Depression and anxiety screening performed and reviewed      Tobacco and Toxic Substance Assessment and Intervention:     Tobacco use screening performed    Alcohol and drug use screening performed    Brief intervention performed for tobacco, alcohol, or drug use    Tobacco cessation counseling provided    Alcohol cessation counseling provided          Return in 6 months (on 2/13/2022) for Recheck anxiety   Chief Complaint:     Chief Complaint   Patient presents with    Physical Exam      History of Present Illness:     Adult Annual Physical   Patient here for a comprehensive physical exam  The patient reports no problems  Diet and Physical Activity  · Diet/Nutrition: well balanced diet and consuming 3-5 servings of fruits/vegetables daily  · Exercise: moderate cardiovascular exercise, vigorous cardiovascular exercise, 3-4 times a week on average and 1-2 hours on average  Depression Screening  PHQ-9 Depression Screening    PHQ-9:   Frequency of the following problems over the past two weeks:      Little interest or pleasure in doing things: 0 - not at all  Feeling down, depressed, or hopeless: 0 - not at all  PHQ-2 Score: 0       General Health  · Sleep: sleeps poorly, gets 4-6 hours of sleep on average, unrefreshing sleep and this has been an ongoing issue  She can fall asleep but wake up and then cannot fall back to sleep     · Hearing: normal - bilateral   · Vision: goes for regular eye exams and wears contacts  · Dental: regular dental visits  /GYN Health  · Patient is: perimenopausal  · Last menstrual period: 8/10/2021, lasting about 5 days  Starting to have night sweats and worsening anxiety that she feels is attributed to this  Her mom also went through menopause around this age as well  · Contraceptive method:  had vasectomy  Review of Systems:     Review of Systems   Constitutional: Negative for activity change, appetite change, chills, diaphoresis, fatigue, fever and unexpected weight change  HENT: Negative for congestion, ear pain, rhinorrhea, sore throat and trouble swallowing  Eyes: Negative for pain and visual disturbance  Respiratory: Negative for cough, shortness of breath and wheezing  Cardiovascular: Negative for chest pain, palpitations and leg swelling  Gastrointestinal: Negative for abdominal pain, blood in stool, constipation, diarrhea and vomiting  Genitourinary: Negative for difficulty urinating, dysuria, frequency and hematuria  Musculoskeletal: Negative for arthralgias, back pain and joint swelling  Skin: Negative for color change and rash  Neurological: Negative for dizziness, seizures, syncope, light-headedness and headaches  Psychiatric/Behavioral: Positive for sleep disturbance  Negative for dysphoric mood  The patient is nervous/anxious  All other systems reviewed and are negative       Past Medical History:     Past Medical History:   Diagnosis Date    Bleeding gums     Last assessed 5/30/2017     Elbow fracture     History of x-ray of elbow fracture    Fatigue     Last assessed 5/30/2017     Fractured coccyx ([address] )     Resolved 9/7/2015     Microscopic hematuria     Last assessed 1/2/2018     Night sweats     Last assesed 5/30/2017     Paresthesias     Last assessed 5/30/2017     Post concussion syndrome     Resolved 9/7/2015     Wrist fracture     History of x-ray wrist fracture       Past Surgical History:     History reviewed  No pertinent surgical history  Social History:     Social History     Socioeconomic History    Marital status: /Civil Union     Spouse name: None    Number of children: 1    Years of education: None    Highest education level: None   Occupational History    None   Tobacco Use    Smoking status: Current Some Day Smoker    Smokeless tobacco: Never Used   Vaping Use    Vaping Use: Never used   Substance and Sexual Activity    Alcohol use: Yes     Comment: occasionally    Drug use: No    Sexual activity: Yes     Partners: Male     Birth control/protection: Male Sterilization   Other Topics Concern    None   Social History Narrative    Education history: College Grad    Former smoker - As per [address]      Social Determinants of Health     Financial Resource Strain:     Difficulty of Paying Living Expenses:    Food Insecurity:     Worried About Running Out of Food in the Last Year:     Ran Out of Food in the Last Year:    Transportation Needs:     Lack of Transportation (Medical):      Lack of Transportation (Non-Medical):    Physical Activity:     Days of Exercise per Week:     Minutes of Exercise per Session:    Stress:     Feeling of Stress :    Social Connections:     Frequency of Communication with Friends and Family:     Frequency of Social Gatherings with Friends and Family:     Attends Temple Services:     Active Member of Clubs or Organizations:     Attends Club or Organization Meetings:     Marital Status:    Intimate Partner Violence:     Fear of Current or Ex-Partner:     Emotionally Abused:     Physically Abused:     Sexually Abused:       Family History:     Family History   Problem Relation Age of Onset    Spina bifida Mother     Diabetes Maternal Grandfather     Prostate cancer Maternal Grandfather 67    No Known Problems Sister     No Known Problems Daughter     No Known Problems Daughter Current Medications:     Current Outpatient Medications   Medication Sig Dispense Refill    sertraline (ZOLOFT) 100 mg tablet Take 1 tablet (100 mg total) by mouth daily 90 tablet 3     No current facility-administered medications for this visit  Allergies: Allergies   Allergen Reactions    Sulfa Antibiotics Hives      Physical Exam:     /80   Pulse 86   Temp 97 8 °F (36 6 °C)   Resp 16   Ht 5' 6" (1 676 m)   Wt 55 8 kg (123 lb)   SpO2 99%   BMI 19 85 kg/m²     Physical Exam  Vitals reviewed  Constitutional:       Appearance: Normal appearance  She is normal weight  HENT:      Head: Normocephalic and atraumatic  Right Ear: Tympanic membrane, ear canal and external ear normal  There is no impacted cerumen  Left Ear: Tympanic membrane, ear canal and external ear normal  There is no impacted cerumen  Nose: Nose normal  No congestion  Mouth/Throat:      Mouth: Mucous membranes are moist       Pharynx: Oropharynx is clear  No oropharyngeal exudate  Eyes:      Extraocular Movements: Extraocular movements intact  Conjunctiva/sclera: Conjunctivae normal       Pupils: Pupils are equal, round, and reactive to light  Neck:      Thyroid: No thyroid mass or thyromegaly  Cardiovascular:      Rate and Rhythm: Normal rate and regular rhythm  Pulses: Normal pulses  Heart sounds: Normal heart sounds  No murmur heard  Pulmonary:      Effort: Pulmonary effort is normal  No respiratory distress  Breath sounds: Normal breath sounds  No wheezing  Abdominal:      General: Abdomen is flat  Bowel sounds are normal  There is no distension  Palpations: Abdomen is soft  Tenderness: There is no abdominal tenderness  Musculoskeletal:      Cervical back: Normal range of motion and neck supple  Right lower leg: No edema  Left lower leg: No edema  Skin:     General: Skin is warm and dry  Neurological:      General: No focal deficit present  Mental Status: She is alert  Cranial Nerves: No cranial nerve deficit  Sensory: No sensory deficit  Motor: No weakness        Gait: Gait normal    Psychiatric:         Mood and Affect: Mood normal          Behavior: Behavior normal           June Hernandez 226

## 2021-08-19 ENCOUNTER — VBI (OUTPATIENT)
Dept: ADMINISTRATIVE | Facility: OTHER | Age: 46
End: 2021-08-19

## 2021-09-08 ENCOUNTER — AMB VIDEO VISIT (OUTPATIENT)
Dept: OTHER | Facility: HOSPITAL | Age: 46
End: 2021-09-08
Payer: COMMERCIAL

## 2021-09-08 PROCEDURE — 99212 OFFICE O/P EST SF 10 MIN: CPT | Performed by: FAMILY MEDICINE

## 2021-09-08 NOTE — CARE ANYWHERE EVISITS
Visit Summary for YASMIN GOLD - Gender: Female - Date of Birth: [id number]  Date: [id number] - Duration: 3 minutes  Patient: YASMIN GOLD  Provider: Azucena Fontaine    Patient Contact Information  Address  [place] [address]  [phone]    Visit Topics  [facility] [[facility]]    Triage Questions   What is your current physical address in the event of a medical emergency? Answer []  Are you allergic to any medications? Answer []  Are you now or could you be pregnant? Answer []  Do you have any immune system compromise or chronic lung   disease? Answer []  Do you have any vulnerable family members in the home (infant, pregnant, cancer, elderly)? Answer []     Conversation Transcripts  [0A][0A] [Notification] Masood Patterson, Global Staff, will help you prepare for your visit  She is assisting Azucena Fontaine Family Physician [0A][Reba Coleman] Song, and thank you for connecting  While you are waiting for the doctor, are there any   questions I can answer for you about our service? Please contact customer service if you have questions about billing, insurance, or technical issues  Visits work best with a stable WiFi connection, so please make sure you are connected before we   [facility], thank you for your patience  The provider will be with you as soon as possible [0A][Notification] Masood Patterson has left the room  [0A][Notification] You are connected with [facility] [0A][Notification]   YASMIN GOLD is located in [place]  [0A][Notification] [facility]  [0A][Notification] Azucena Fontaine has added a diagnosis/procedure code  [0A][Notification] Azucena Fontaine has added a prescription  [0A]    Diagnosis  Acute cystitis without hematuria    Procedures    Medications Prescribed    Macrobid      Frequency :   Patient Instructions :  Take 1 capsule twice a day for 5 days  Refills : 0  Instructions to the Pharmacist : Nery Cordon allowed      Provider Notes  [0A][0A] We strongly encourage you to share the following record of today's visit with your primary care physician  Patient calling from:  [place][0A]CC/HPI: UTI symptoms  Duration of symptoms: This morning  Dysuria: yesIncreased urinary frequency:   yesIncreased urgency of urination: yesSuprapubic pressure: No  [0A]Fever: No Hematuria: No Lower back pain: No Flank pain: No[0A]Vaginal discharge or itching: No Sexually active: YesConcern for STI: NoLast UTI: 12/2020  Treated online  LMP:    8/10/21  Preg/lact: No  LMP: [0A]Allergies: sulfa  [0A]Meds: zoloft  Pmx: anxiety  Psh: None pertinent  [0A]Gen: A&OX3, NAD, normal mental status and interaction, not toxic  Abdomen: Tenderness to palpation X 4 quadrants: no    Suprapubic tenderness: no  CVA tenderness: no Respiratory: Normal effort without distress  [0A]Assessment and Plan: Acute cystitis  Typical presentation for UTI  No underlying medical conditions or evidence of complications  Treatment with antibiotics without urine collection for   culture appropriate according to guidelines  If not better in 48 hours will require in-person evaluation and urine culture  1st line antibiotic: macrobid  [0A]Recommendations:[0A]Increase water intake  Cranberry juice  Wipe front to back  [0A]Follow up: If   not better in 2 days, needs in-person follow up for urine culture  Physical exam and STD testing are sometimes needed  If you received a prescription at this visit, it was sent electronically and can be picked up in about one hour  Call our prescription   hotline for questions or concerns 24 hours a day at: [phone]  [0A]Taking a probiotic (either in pill form or by eating yogurt that contains probiotics) while using antibiotics can help prevent bothersome side effects that antibiotics can sometimes   cause  [0A]Voiced understanding and agreement with the plan  [0A]Additional information can be found at [email or web address] [0A]Please print a copy of this note and send it to your regular doctor, or take it to your next visit so it may be included in   your medical record  [0A]    Electronically signed byMarjorie Pierre(NPI [id number])

## 2021-09-20 ENCOUNTER — CLINICAL SUPPORT (OUTPATIENT)
Dept: FAMILY MEDICINE CLINIC | Facility: OTHER | Age: 46
End: 2021-09-20

## 2021-09-20 DIAGNOSIS — J02.9 SORE THROAT: Primary | ICD-10-CM

## 2021-09-20 PROCEDURE — U0003 INFECTIOUS AGENT DETECTION BY NUCLEIC ACID (DNA OR RNA); SEVERE ACUTE RESPIRATORY SYNDROME CORONAVIRUS 2 (SARS-COV-2) (CORONAVIRUS DISEASE [COVID-19]), AMPLIFIED PROBE TECHNIQUE, MAKING USE OF HIGH THROUGHPUT TECHNOLOGIES AS DESCRIBED BY CMS-2020-01-R: HCPCS | Performed by: FAMILY MEDICINE

## 2021-09-20 PROCEDURE — U0005 INFEC AGEN DETEC AMPLI PROBE: HCPCS | Performed by: FAMILY MEDICINE

## 2021-09-20 PROCEDURE — RECHECK

## 2021-09-21 ENCOUNTER — TELEMEDICINE (OUTPATIENT)
Dept: FAMILY MEDICINE CLINIC | Facility: CLINIC | Age: 46
End: 2021-09-21
Payer: COMMERCIAL

## 2021-09-21 DIAGNOSIS — J06.9 VIRAL UPPER RESPIRATORY TRACT INFECTION: Primary | ICD-10-CM

## 2021-09-21 LAB — SARS-COV-2 RNA RESP QL NAA+PROBE: NEGATIVE

## 2021-09-21 PROCEDURE — 99212 OFFICE O/P EST SF 10 MIN: CPT | Performed by: FAMILY MEDICINE

## 2021-09-21 NOTE — PROGRESS NOTES
COVID-19 Outpatient Progress Note    Assessment/Plan:    Problem List Items Addressed This Visit     None      Visit Diagnoses     Viral upper respiratory tract infection    -  Primary         Disposition:     I have spent 5 minutes directly with the patient  Greater than 50% of this time was spent in counseling/coordination of care regarding: diagnostic results and prognosis  Patient with negative COVID test - encouraged continued masking and social distancing  No COVID related isolation required  Ideal to stay home from work while feeling unwell  Tylenol or Advil for any fevers  Mucinex or Robitussin for congestion  Encouraged to stay hydrated  To call if symptoms worsen or progress  Verification of patient location:    Patient is located in the following state in which I hold an active license [place]    Encounter provider Annabel Matthews DO    Provider located at [address] [place] [id number] [phone]    Recent Visits  Date Type Provider Dept   09/21/21 [address], [address] recent visits within past 7 days and meeting all other requirements  Future Appointments  No visits were found meeting these conditions  Showing future appointments within next 150 days and meeting all other requirements     This virtual check-in was done via telephone and she agrees to proceed  Patient agrees to participate in a virtual check in via telephone or video visit instead of presenting to the office to address urgent/immediate medical needs  Patient is aware this is a billable service  After connecting through Telephone, the patient was identified by name and date of birth  Latrice Hand was informed that this was a telemedicine visit and that the exam was being conducted confidentially over secure lines  My office door was closed  No one else was in the room   Latrice Marroquin acknowledged consent and understanding of privacy and security of the telemedicine visit  I informed the patient that I have reviewed her record in Epic and presented the opportunity for her to ask any questions regarding the visit today  The patient agreed to participate  Subjective: Ambrocio Drake is a 42 y o  female who is concerned about COVID-19  Patient's symptoms include fever, fatigue, nasal congestion, rhinorrhea, sore throat, shortness of breath and headache  Patient denies chills, malaise, anosmia, loss of taste, cough, chest tightness, abdominal pain, nausea, vomiting, diarrhea and myalgias  Date of symptom onset: 9/19/2021  COVID-19 vaccination status: Fully vaccinated    Lab Results   Component Value Date    SARSCOV2 Negative 09/20/2021     Past Medical History:   Diagnosis Date    Bleeding gums     Last assessed 5/30/2017     Elbow fracture     History of x-ray of elbow fracture    Fatigue     Last assessed 5/30/2017     Fractured coccyx ([address] )     Resolved 9/7/2015     Microscopic hematuria     Last assessed 1/2/2018     Night sweats     Last assesed 5/30/2017     Paresthesias     Last assessed 5/30/2017     Post concussion syndrome     Resolved 9/7/2015     Wrist fracture     History of x-ray wrist fracture      No past surgical history on file  Current Outpatient Medications   Medication Sig Dispense Refill    sertraline (ZOLOFT) 100 mg tablet Take 1 tablet (100 mg total) by mouth daily 90 tablet 3     No current facility-administered medications for this visit  Allergies   Allergen Reactions    Sulfa Antibiotics Hives       Review of Systems   Constitutional: Positive for fatigue and fever  Negative for chills  HENT: Positive for congestion, rhinorrhea and sore throat  Respiratory: Positive for shortness of breath  Negative for cough and chest tightness  Gastrointestinal: Negative for abdominal pain, diarrhea, nausea and vomiting  Musculoskeletal: Negative for myalgias  Neurological: Positive for headaches  Objective: There were no vitals filed for this visit  Physical Exam  Vitals reviewed: No conversational dyspnea  Physical exam not possible due to telephone encounter  VIRTUAL VISIT DISCLAIMER    Malini Meneses verbally agrees to participate in [employer]  Pt is aware that [employer] could be limited without vital signs or the ability to perform a full hands-on physical Nicholas Soulier understands she or the provider may request at any time to terminate the video visit and request the patient to seek care or treatment in person

## 2021-09-22 NOTE — RESULT ENCOUNTER NOTE
Please call Hina Duarte and let her know that her COVID-19 swab was negative  Continue social distancing procedures

## 2021-10-06 ENCOUNTER — VBI (OUTPATIENT)
Dept: ADMINISTRATIVE | Facility: OTHER | Age: 46
End: 2021-10-06

## 2021-12-16 ENCOUNTER — VBI (OUTPATIENT)
Dept: ADMINISTRATIVE | Facility: OTHER | Age: 46
End: 2021-12-16

## 2022-02-24 ENCOUNTER — VBI (OUTPATIENT)
Dept: ADMINISTRATIVE | Facility: OTHER | Age: 47
End: 2022-02-24

## 2022-03-29 NOTE — ADDENDUM NOTE
Addended by: Cliff Clemons on: 11/20/2018 11:56 AM     Modules accepted: Efrain Z Plasty Text: The lesion was extirpated to the level of the fat with a #15 scalpel blade.  Given the location of the defect, shape of the defect and the proximity to free margins a Z-plasty was deemed most appropriate for repair.  Using a sterile surgical marker, the appropriate transposition arms of the Z-plasty were drawn incorporating the defect and placing the expected incisions within the relaxed skin tension lines where possible.    The area thus outlined was incised deep to adipose tissue with a #15 scalpel blade.  The skin margins were undermined to an appropriate distance in all directions utilizing iris scissors.  The opposing transposition arms were then transposed into place in opposite direction and anchored with interrupted buried subcutaneous sutures.

## 2022-04-13 ENCOUNTER — VBI (OUTPATIENT)
Dept: ADMINISTRATIVE | Facility: OTHER | Age: 47
End: 2022-04-13

## 2022-07-15 ENCOUNTER — OFFICE VISIT (OUTPATIENT)
Dept: FAMILY MEDICINE CLINIC | Facility: OTHER | Age: 47
End: 2022-07-15
Payer: COMMERCIAL

## 2022-07-15 ENCOUNTER — TELEPHONE (OUTPATIENT)
Dept: FAMILY MEDICINE CLINIC | Facility: OTHER | Age: 47
End: 2022-07-15

## 2022-07-15 VITALS
TEMPERATURE: 98 F | DIASTOLIC BLOOD PRESSURE: 72 MMHG | SYSTOLIC BLOOD PRESSURE: 110 MMHG | OXYGEN SATURATION: 98 % | WEIGHT: 121 LBS | RESPIRATION RATE: 16 BRPM | HEART RATE: 96 BPM | HEIGHT: 66 IN | BODY MASS INDEX: 19.44 KG/M2

## 2022-07-15 DIAGNOSIS — Z13.220 SCREENING, LIPID: ICD-10-CM

## 2022-07-15 DIAGNOSIS — F41.9 ANXIETY: Primary | ICD-10-CM

## 2022-07-15 DIAGNOSIS — Z11.59 ENCOUNTER FOR HEPATITIS C SCREENING TEST FOR LOW RISK PATIENT: ICD-10-CM

## 2022-07-15 DIAGNOSIS — F32.1 MODERATE MAJOR DEPRESSION (HCC): ICD-10-CM

## 2022-07-15 DIAGNOSIS — R53.83 OTHER FATIGUE: ICD-10-CM

## 2022-07-15 DIAGNOSIS — Z11.4 SCREENING FOR HIV (HUMAN IMMUNODEFICIENCY VIRUS): ICD-10-CM

## 2022-07-15 PROCEDURE — 99214 OFFICE O/P EST MOD 30 MIN: CPT | Performed by: FAMILY MEDICINE

## 2022-07-15 PROCEDURE — 3725F SCREEN DEPRESSION PERFORMED: CPT | Performed by: FAMILY MEDICINE

## 2022-07-15 RX ORDER — VENLAFAXINE HYDROCHLORIDE 37.5 MG/1
37.5 CAPSULE, EXTENDED RELEASE ORAL
Qty: 30 CAPSULE | Refills: 0 | Status: CANCELLED | OUTPATIENT
Start: 2022-07-15

## 2022-07-15 RX ORDER — ALPRAZOLAM 0.25 MG/1
0.25 TABLET ORAL
Qty: 14 TABLET | Refills: 0 | Status: CANCELLED | OUTPATIENT
Start: 2022-07-15

## 2022-07-15 RX ORDER — LORAZEPAM 0.5 MG/1
0.5 TABLET ORAL 2 TIMES DAILY PRN
Qty: 30 TABLET | Refills: 0 | Status: SHIPPED | OUTPATIENT
Start: 2022-07-15

## 2022-07-15 RX ORDER — FLUOXETINE 10 MG/1
10 CAPSULE ORAL DAILY
Qty: 21 CAPSULE | Refills: 0 | Status: SHIPPED | OUTPATIENT
Start: 2022-07-15 | End: 2022-08-05 | Stop reason: ALTCHOICE

## 2022-07-15 NOTE — TELEPHONE ENCOUNTER
Pt asking if other anxiety medication is going to be sent to pharmacy today ?    I only see the Prozac    Thank  you     Gianna Velez MA

## 2022-07-15 NOTE — PROGRESS NOTES
Assessment/Plan:    Anxiety  MILADYS-7 score: 21  Patient reports exacerbation in her anxiety after having witnessed her mother have a heart attack almost a week ago  She states she is having panic attacks at night every night that are preventing her from being able to get good sleep  Plan  - D/c Zoloft  - Start Fluoxetine 10 mg qd   - Start Ativan 0 5 mg BID PRN for anxiety  - F/u in 4 weeks     Moderate major depression (HCC)  PHQ-9 score: 21 (moderate depression)  Patient is without any thoughts of harming herself or others  Plan  - Discuss f/u with therapist   - refer to Anxiety    Fatigue  Fatigue likely correlated with poor sleep due to anxiety/depression  Patient is report anxiety attacks at night after witnessing her mother have a heart attack  Plan  - Ativan 0 5 mg PRN anxiety   - F/u in 4 weeks   - TSH  - CBC   - Vit D       Diagnoses and all orders for this visit:    Anxiety  -     FLUoxetine (PROzac) 10 mg capsule; Take 1 capsule (10 mg total) by mouth daily  -     LORazepam (Ativan) 0 5 mg tablet; Take 1 tablet (0 5 mg total) by mouth 2 (two) times a day as needed for anxiety    Moderate major depression (HCC)    Screening for HIV (human immunodeficiency virus)  -     HIV 1/2 Antigen/Antibody (4th Generation) w Reflex [facility]; Future    Other fatigue  -     TSH, 3rd generation with Free T4 reflex; Future  -     Vitamin D 25 hydroxy; Future  -     CBC and differential; Future    Screening, lipid  -     Lipid Panel with Direct LDL reflex; Future    Encounter for hepatitis C screening test for low risk patient  -     Hepatitis C antibody; Future          Subjective:      Patient ID: Karson Kovacs is a 41 y o  female  PMHx of Anxiety presents to discuss restarting medication after having stopped taking her Zoloft  Patient was on Zoloft for a number of years due to her ongoing history of anxiety   She reports that she stopped taking Zoloft as she no longer saw any benefit from it and was experiencing diarrhea  Her anxiety has recently become exacerbated after witnessing her mother having a heart attack almost a week prior  Her mother is currently doing well but the patient feels traumatized from witnessing her mother on the ground during the episode  She reports having panic attacks at night and are inhibiting her sleep  The following portions of the patient's history were reviewed and updated as appropriate: allergies, current medications, past family history, past medical history, past social history, past surgical history and problem list     Review of Systems   Constitutional: Positive for fatigue  Negative for activity change and appetite change  HENT: Negative for congestion and rhinorrhea  Eyes: Negative for visual disturbance  Respiratory: Negative for chest tightness and shortness of breath  Cardiovascular: Negative for chest pain and palpitations  Gastrointestinal: Negative for abdominal distention, abdominal pain, constipation and diarrhea  Genitourinary: Positive for menstrual problem  Neurological: Negative for dizziness, light-headedness and headaches  Psychiatric/Behavioral: Positive for dysphoric mood  The patient is nervous/anxious  Objective:      /72   Pulse 96   Temp 98 °F (36 7 °C)   Resp 16   Ht 5' 6" (1 676 m)   Wt 54 9 kg (121 lb)   SpO2 98%   BMI 19 53 kg/m²          Physical Exam  Constitutional:       Appearance: Normal appearance  HENT:      Head: Normocephalic and atraumatic  Right Ear: External ear normal       Left Ear: External ear normal    Eyes:      Extraocular Movements: Extraocular movements intact  Conjunctiva/sclera: Conjunctivae normal    Cardiovascular:      Rate and Rhythm: Normal rate and regular rhythm  Pulses: Normal pulses  Heart sounds: Normal heart sounds  Pulmonary:      Effort: Pulmonary effort is normal       Breath sounds: Normal breath sounds  Abdominal:      General: Abdomen is flat  Palpations: Abdomen is soft  Musculoskeletal:      Right lower leg: No edema  Left lower leg: No edema  Skin:     General: Skin is warm and dry  Neurological:      Mental Status: She is alert and oriented to person, place, and time  Psychiatric:         Mood and Affect: Mood is anxious           Speech: Speech normal          Behavior: Behavior normal

## 2022-07-21 PROBLEM — F32.1 MODERATE MAJOR DEPRESSION (HCC): Status: ACTIVE | Noted: 2022-07-21

## 2022-07-21 NOTE — ASSESSMENT & PLAN NOTE
Fatigue likely correlated with poor sleep due to anxiety/depression  Patient is report anxiety attacks at night after witnessing her mother have a heart attack      Plan  - Ativan 0 5 mg PRN anxiety   - F/u in 4 weeks   - TSH  - CBC   - Vit D

## 2022-07-21 NOTE — ASSESSMENT & PLAN NOTE
MILADYS-7 score: 21  Patient reports exacerbation in her anxiety after having witnessed her mother have a heart attack almost a week ago  She states she is having panic attacks at night every night that are preventing her from being able to get good sleep       Plan  - D/c Zoloft  - Start Fluoxetine 10 mg qd   - Start Ativan 0 5 mg BID PRN for anxiety  - F/u in 4 weeks

## 2022-07-21 NOTE — ASSESSMENT & PLAN NOTE
PHQ-9 score: 21 (moderate depression)  Patient is without any thoughts of harming herself or others       Plan  - Discuss f/u with therapist   - refer to Anxiety

## 2022-07-22 ENCOUNTER — APPOINTMENT (OUTPATIENT)
Dept: LAB | Facility: CLINIC | Age: 47
End: 2022-07-22
Payer: COMMERCIAL

## 2022-07-22 DIAGNOSIS — Z13.220 SCREENING, LIPID: ICD-10-CM

## 2022-07-22 DIAGNOSIS — Z11.59 ENCOUNTER FOR HEPATITIS C SCREENING TEST FOR LOW RISK PATIENT: ICD-10-CM

## 2022-07-22 DIAGNOSIS — Z11.4 SCREENING FOR HIV (HUMAN IMMUNODEFICIENCY VIRUS): ICD-10-CM

## 2022-07-22 DIAGNOSIS — R53.83 OTHER FATIGUE: ICD-10-CM

## 2022-07-22 LAB
25(OH)D3 SERPL-MCNC: 30.4 NG/ML (ref 30–100)
BASOPHILS # BLD AUTO: 0.08 THOUSANDS/ΜL (ref 0–0.1)
BASOPHILS NFR BLD AUTO: 1 % (ref 0–1)
CHOLEST SERPL-MCNC: 182 MG/DL
EOSINOPHIL # BLD AUTO: 0.27 THOUSAND/ΜL (ref 0–0.61)
EOSINOPHIL NFR BLD AUTO: 5 % (ref 0–6)
ERYTHROCYTE [DISTWIDTH] IN BLOOD BY AUTOMATED COUNT: 12.8 % (ref 11.6–15.1)
HCT VFR BLD AUTO: 37.1 % (ref 34.8–46.1)
HCV AB SER QL: NORMAL
HDLC SERPL-MCNC: 75 MG/DL
HGB BLD-MCNC: 12.2 G/DL (ref 11.5–15.4)
IMM GRANULOCYTES # BLD AUTO: 0.02 THOUSAND/UL (ref 0–0.2)
IMM GRANULOCYTES NFR BLD AUTO: 0 % (ref 0–2)
LDLC SERPL CALC-MCNC: 90 MG/DL (ref 0–100)
LYMPHOCYTES # BLD AUTO: 2.25 THOUSANDS/ΜL (ref 0.6–4.47)
LYMPHOCYTES NFR BLD AUTO: 38 % (ref 14–44)
MCH RBC QN AUTO: 32.1 PG (ref 26.8–34.3)
MCHC RBC AUTO-ENTMCNC: 32.9 G/DL (ref 31.4–37.4)
MCV RBC AUTO: 98 FL (ref 82–98)
MONOCYTES # BLD AUTO: 0.43 THOUSAND/ΜL (ref 0.17–1.22)
MONOCYTES NFR BLD AUTO: 7 % (ref 4–12)
NEUTROPHILS # BLD AUTO: 2.86 THOUSANDS/ΜL (ref 1.85–7.62)
NEUTS SEG NFR BLD AUTO: 49 % (ref 43–75)
NRBC BLD AUTO-RTO: 0 /100 WBCS
PLATELET # BLD AUTO: 307 THOUSANDS/UL (ref 149–390)
PMV BLD AUTO: 10.2 FL (ref 8.9–12.7)
RBC # BLD AUTO: 3.8 MILLION/UL (ref 3.81–5.12)
TRIGL SERPL-MCNC: 85 MG/DL
TSH SERPL DL<=0.05 MIU/L-ACNC: 1.49 UIU/ML (ref 0.45–4.5)
WBC # BLD AUTO: 5.91 THOUSAND/UL (ref 4.31–10.16)

## 2022-07-22 PROCEDURE — 80061 LIPID PANEL: CPT

## 2022-07-22 PROCEDURE — 84443 ASSAY THYROID STIM HORMONE: CPT

## 2022-07-22 PROCEDURE — 86803 HEPATITIS C AB TEST: CPT

## 2022-07-22 PROCEDURE — 85025 COMPLETE CBC W/AUTO DIFF WBC: CPT

## 2022-07-22 PROCEDURE — 36415 COLL VENOUS BLD VENIPUNCTURE: CPT

## 2022-07-22 PROCEDURE — 82306 VITAMIN D 25 HYDROXY: CPT

## 2022-07-22 PROCEDURE — 87389 HIV-1 AG W/HIV-1&-2 AB AG IA: CPT

## 2022-07-23 LAB — HIV 1+2 AB+HIV1 P24 AG SERPL QL IA: NORMAL

## 2022-08-05 ENCOUNTER — OFFICE VISIT (OUTPATIENT)
Dept: FAMILY MEDICINE CLINIC | Facility: OTHER | Age: 47
End: 2022-08-05
Payer: COMMERCIAL

## 2022-08-05 VITALS
HEART RATE: 77 BPM | TEMPERATURE: 98 F | BODY MASS INDEX: 19.09 KG/M2 | WEIGHT: 118.8 LBS | DIASTOLIC BLOOD PRESSURE: 70 MMHG | HEIGHT: 66 IN | SYSTOLIC BLOOD PRESSURE: 104 MMHG | OXYGEN SATURATION: 98 % | RESPIRATION RATE: 18 BRPM

## 2022-08-05 DIAGNOSIS — F41.9 ANXIETY: Primary | ICD-10-CM

## 2022-08-05 PROCEDURE — 99213 OFFICE O/P EST LOW 20 MIN: CPT | Performed by: FAMILY MEDICINE

## 2022-08-05 PROCEDURE — 3725F SCREEN DEPRESSION PERFORMED: CPT | Performed by: FAMILY MEDICINE

## 2022-08-05 RX ORDER — VENLAFAXINE HYDROCHLORIDE 37.5 MG/1
37.5 CAPSULE, EXTENDED RELEASE ORAL
Qty: 30 CAPSULE | Refills: 5 | Status: SHIPPED | OUTPATIENT
Start: 2022-08-05

## 2022-08-05 NOTE — PROGRESS NOTES
Assessment/Plan:    Anxiety  Patient presenting after starting on 10 mg Fluoxetine 3 weeks prior  Despite being very early in the initiation of SSRI treatment the patient reports no signs of any change in her mood of demeanor  She reports continued issues with feelings of nervousness  Her MILADYS-7 score in office has remained at 16, PHQ-9 of 6  She reports improvement in her sleep having given her Ativan which she is not dependant on  She is getting 6 - 7 hours per night  She mentions interest in follow up with a therapist she already has in mind  Plan  - F/u with therapist   - D/C Prozac  - START Venlafaxine 37 5 mg   - F/u in 4 weeks        Diagnoses and all orders for this visit:    Anxiety  -     venlafaxine (EFFEXOR-XR) 37 5 mg 24 hr capsule; Take 1 capsule (37 5 mg total) by mouth daily with breakfast          Subjective:      Patient ID: Samantha Gonzalez is a 41 y o  female  PMHx of Anxiety, depression presents for follow up after initiating therapy with Fluoxetine 3 weeks prior  Patient reports no noticeable benefit/change in mood after having started on the 10 mg dose  She reports continued issues with feelings of nervousness, difficulty relaxing and being easily annoyed/irritable  She mentions that the Ativan has helped her sleep at night which she was having difficulty with surrounding the traumatic events of her mother MI  She reports not being dependant on Ativan for sleep and is not requesting a refill at this time  She does report having quit smoking 3-4 months ago  The following portions of the patient's history were reviewed and updated as appropriate: allergies, current medications, past family history, past medical history, past social history, past surgical history and problem list     Review of Systems   Constitutional: Negative for appetite change and fatigue  HENT: Negative for congestion and rhinorrhea  Eyes: Negative for visual disturbance     Respiratory: Negative for shortness of breath  Cardiovascular: Negative for chest pain and palpitations  Gastrointestinal: Negative for constipation and diarrhea  Endocrine: Negative for polyuria  Genitourinary: Negative for hematuria  Musculoskeletal: Negative for arthralgias and myalgias  Skin: Negative for color change  Neurological: Negative for dizziness, light-headedness and headaches  Psychiatric/Behavioral: Negative for self-injury, sleep disturbance and suicidal ideas  The patient is nervous/anxious  Objective:      /70   Pulse 77   Temp 98 °F (36 7 °C)   Resp 18   Ht 5' 6" (1 676 m)   Wt 53 9 kg (118 lb 12 8 oz)   SpO2 98%   BMI 19 17 kg/m²          Physical Exam  Constitutional:       Appearance: Normal appearance  HENT:      Head: Normocephalic and atraumatic  Right Ear: External ear normal       Left Ear: External ear normal       Nose: Nose normal    Eyes:      Extraocular Movements: Extraocular movements intact  Conjunctiva/sclera: Conjunctivae normal    Cardiovascular:      Rate and Rhythm: Normal rate and regular rhythm  Pulses: Normal pulses  Heart sounds: Normal heart sounds  Pulmonary:      Effort: Pulmonary effort is normal       Breath sounds: Normal breath sounds  Abdominal:      General: Abdomen is flat  Palpations: Abdomen is soft  Musculoskeletal:      Right lower leg: No edema  Left lower leg: No edema  Skin:     General: Skin is warm and dry  Neurological:      Mental Status: She is alert and oriented to person, place, and time

## 2022-08-10 ENCOUNTER — VBI (OUTPATIENT)
Dept: ADMINISTRATIVE | Facility: OTHER | Age: 47
End: 2022-08-10

## 2022-08-16 NOTE — ASSESSMENT & PLAN NOTE
Patient presenting after starting on 10 mg Fluoxetine 3 weeks prior  Despite being very early in the initiation of SSRI treatment the patient reports no signs of any change in her mood of demeanor  She reports continued issues with feelings of nervousness  Her MILADYS-7 score in office has remained at 16, PHQ-9 of 6  She reports improvement in her sleep having given her Ativan which she is not dependant on  She is getting 6 - 7 hours per night  She mentions interest in follow up with a therapist she already has in mind       Plan  - F/u with therapist   - D/C Prozac  - START Venlafaxine 37 5 mg   - F/u in 4 weeks

## 2022-09-02 ENCOUNTER — OFFICE VISIT (OUTPATIENT)
Dept: FAMILY MEDICINE CLINIC | Facility: OTHER | Age: 47
End: 2022-09-02
Payer: COMMERCIAL

## 2022-09-02 VITALS
DIASTOLIC BLOOD PRESSURE: 82 MMHG | BODY MASS INDEX: 19.44 KG/M2 | OXYGEN SATURATION: 98 % | TEMPERATURE: 97.8 F | WEIGHT: 121 LBS | HEIGHT: 66 IN | RESPIRATION RATE: 18 BRPM | SYSTOLIC BLOOD PRESSURE: 124 MMHG | HEART RATE: 86 BPM

## 2022-09-02 DIAGNOSIS — F41.9 ANXIETY: Primary | ICD-10-CM

## 2022-09-02 DIAGNOSIS — Z12.31 ENCOUNTER FOR SCREENING MAMMOGRAM FOR MALIGNANT NEOPLASM OF BREAST: ICD-10-CM

## 2022-09-02 DIAGNOSIS — R11.15: ICD-10-CM

## 2022-09-02 PROCEDURE — 99213 OFFICE O/P EST LOW 20 MIN: CPT | Performed by: FAMILY MEDICINE

## 2022-09-02 NOTE — PROGRESS NOTES
Assessment/Plan:    Anxiety  Patient reports significant improvement in mood after starting on Venlafaxine  Prior attempts have failed after a trial of Fluoxetine and after being on Sertraline for an extended period of time before that  Patients MILADYS-7 score has dropped from 16 to 7 on examination today  She reports initial episodes of headache and disorientation during the first couple of days to week after initiating the medication that has now subsided  She reports taking the medication with food helps a lot  She is without any side effect symptoms at this time such as N/V, ongoing headaches, disorientation, sleep problems, tachycardia, changes in appetite, diarrhea or constipation  Plan  - Continue Venlafaxine 37 5 mg XR   - F/u in 3 months    Periodic vomiting  At office visit today patient mentions having an ongoing history of recurrent vomiting episodes after eating and worse with particular foods  She mentions that she possibly has delayed gastric emptying/gastroparesis diagnosis in the past  She is being followed by GI and is due to see them next month  Symptoms have been present prior to initiating therapy  She mentions that eating small meals and taking her time eating has helped  Plan  - F/u with GI  - May benefit from metoclopramide; at risk of serotonin syndrome however while on Venlafaxine        Diagnoses and all orders for this visit:    Anxiety    Periodic vomiting    Encounter for screening mammogram for malignant neoplasm of breast  -     Mammo screening bilateral w 3d & cad; Future          Subjective:      Patient ID: Darrin Leung is a 41 y o  female  PMHx of Anxiety, depression presents for follow up after initiating therapy with Venlafaxine 4 weeks ago  Patient is reporting improvement in her mood and feelings of less anxiety now  Previous attempts have failed with Fluoxetine  She was also on Sertraline for an extended period of time withsome mild improvement   As of now she reports a decrease in anxiety that she correlates with the medication and also with her children being back in school  She reports having experienced headaches and feelings of disorientation when initially starting the medication during the first couple of days to week that eventually subsided  She is currently without any known side effects such as ongoing headaches, disorientation, sleep problems, tachycardia, changes in appetite, diarrhea or constipation  She does report continuing to taker her Ativan PRN at night for sleep  Patient does report ongoing episodes of vomiting a lot that is worse with certain foods  This is an issue that has been present prior to starting medication  She reports a history of gastroparesis that she is seeing GI for  She has an appointment next month to see GI  The following portions of the patient's history were reviewed and updated as appropriate: allergies, current medications, past family history, past medical history, past social history, past surgical history and problem list     Review of Systems   Constitutional: Negative for appetite change and fatigue  HENT: Negative for congestion and rhinorrhea  Eyes: Negative for visual disturbance  Respiratory: Negative for chest tightness  Cardiovascular: Negative for chest pain and palpitations  Gastrointestinal: Positive for vomiting (reports vomiting episodes frequently after eating; possible hx of gastroparesis)  Negative for constipation, diarrhea and nausea  Endocrine: Negative for polyuria  Genitourinary: Negative for dysuria and hematuria  Skin: Negative for color change  Neurological: Negative for dizziness, weakness, light-headedness and headaches  Psychiatric/Behavioral: Negative for hallucinations and sleep disturbance  The patient is nervous/anxious            Objective:      /82   Pulse 86   Temp 97 8 °F (36 6 °C)   Resp 18   Ht 5' 6" (1 676 m)   Wt 54 9 kg (121 lb)   SpO2 98% BMI 19 53 kg/m²          Physical Exam  Constitutional:       General: She is not in acute distress  Appearance: Normal appearance  She is not ill-appearing  HENT:      Head: Normocephalic and atraumatic  Right Ear: External ear normal       Left Ear: External ear normal       Nose: Nose normal    Eyes:      Extraocular Movements: Extraocular movements intact  Conjunctiva/sclera: Conjunctivae normal    Cardiovascular:      Rate and Rhythm: Normal rate and regular rhythm  Pulses: Normal pulses  Heart sounds: Normal heart sounds  Pulmonary:      Effort: Pulmonary effort is normal       Breath sounds: Normal breath sounds  Abdominal:      General: Abdomen is flat  Palpations: Abdomen is soft  Musculoskeletal:         General: Normal range of motion  Right lower leg: No edema  Left lower leg: No edema  Skin:     General: Skin is warm and dry  Neurological:      Mental Status: She is alert and oriented to person, place, and time

## 2022-09-03 PROBLEM — R11.15 PERIODIC VOMITING: Status: ACTIVE | Noted: 2022-09-03

## 2022-09-03 NOTE — ASSESSMENT & PLAN NOTE
Patient reports significant improvement in mood after starting on Venlafaxine  Prior attempts have failed after a trial of Fluoxetine and after being on Sertraline for an extended period of time before that  Patients MILADYS-7 score has dropped from 16 to 7 on examination today  She reports initial episodes of headache and disorientation during the first couple of days to week after initiating the medication that has now subsided  She reports taking the medication with food helps a lot  She is without any side effect symptoms at this time such as N/V, ongoing headaches, disorientation, sleep problems, tachycardia, changes in appetite, diarrhea or constipation         Plan  - Continue Venlafaxine 37 5 mg XR   - F/u in 3 months

## 2022-09-03 NOTE — ASSESSMENT & PLAN NOTE
At office visit today patient mentions having an ongoing history of recurrent vomiting episodes after eating and worse with particular foods  She mentions that she possibly has delayed gastric emptying/gastroparesis diagnosis in the past  She is being followed by GI and is due to see them next month  Symptoms have been present prior to initiating therapy  She mentions that eating small meals and taking her time eating has helped         Plan  - F/u with GI  - May benefit from metoclopramide; at risk of serotonin syndrome however while on Venlafaxine

## 2022-10-05 ENCOUNTER — AMB VIDEO VISIT (OUTPATIENT)
Dept: OTHER | Facility: HOSPITAL | Age: 47
End: 2022-10-05
Payer: COMMERCIAL

## 2022-10-05 DIAGNOSIS — J01.00 ACUTE NON-RECURRENT MAXILLARY SINUSITIS: Primary | ICD-10-CM

## 2022-10-05 PROBLEM — R39.9 UTI SYMPTOMS: Status: RESOLVED | Noted: 2020-05-28 | Resolved: 2022-10-05

## 2022-10-05 PROCEDURE — 99212 OFFICE O/P EST SF 10 MIN: CPT | Performed by: PHYSICIAN ASSISTANT

## 2022-10-05 RX ORDER — DOXYCYCLINE HYCLATE 100 MG
100 TABLET ORAL 2 TIMES DAILY
Qty: 14 TABLET | Refills: 0 | Status: SHIPPED | OUTPATIENT
Start: 2022-10-05 | End: 2022-10-12

## 2022-10-05 NOTE — PROGRESS NOTES
Video Visit - Yane Colby 41 y o  female MRN: [id number]    REQUIRED DOCUMENTATION:         1  This service was provided via [employer]  2  Provider located at [address] [place] [phone]  3  [facility] provider: Claudene Couch, PA-C   4  Identify all parties in room with patient during [facility] visit:  significant other-permission granted  5  After connecting through [employer], patient was identified by name and date of birth  Patient was then informed that this was a Telemedicine visit and that the exam was being conducted confidentially over secure lines  My office door was closed  No one else was in the room  Patient acknowledged consent and understanding of privacy and security of the Telemedicine visit  I informed the patient that I have reviewed their record in Epic and presented the opportunity for them to ask any questions regarding the visit today  The patient agreed to participate  VITALS: Heart Rate: 72 BPM, Respiratory Rate: 16 RPM, Temperature Unavailable° F, Blood Pressure Unavailable mmHg, Pulse Ox Unavailable % on RA    HPI  Pt reports URI since last Sunday (10 days)  Reports her mucous in mornings is green  Reports congestion in head, facial pressure and chest congestion  Tried mucinex, allegra-D  Reports has been taking left-over antibiotics for 4 days now  Taking amoxicillin 500 mg TID without relief  Physical Exam  Constitutional:       General: She is not in acute distress  Appearance: Normal appearance  She is not toxic-appearing  HENT:      Head:      Comments: Wearing hat     Nose: No rhinorrhea  Comments: Sounds mildly congested     Mouth/Throat:      Mouth: Mucous membranes are moist    Eyes:      Conjunctiva/sclera: Conjunctivae normal    Cardiovascular:      Rate and Rhythm: Normal rate  Pulmonary:      Effort: Pulmonary effort is normal  No respiratory distress  Breath sounds:  No wheezing (no gross audible wheeze through computer)  Musculoskeletal:      Cervical back: Normal range of motion  Skin:     Findings: No rash (on face or neck)  Neurological:      Mental Status: She is alert  Cranial Nerves: No dysarthria or facial asymmetry  Psychiatric:         Mood and Affect: Mood normal          Behavior: Behavior normal          Diagnoses and all orders for this visit:    Acute non-recurrent maxillary sinusitis  -     doxycycline hyclate (VIBRA-TABS) 100 mg tablet; Take 1 tablet (100 mg total) by mouth 2 (two) times a day for 7 days      Patient Instructions   As discussed, sinus infections are typically viral and will get better on their own in 7-10 days  You should try symptomatic relief in the meantime with OTC (Claritin or Zyrtec), Flonase, and Sudafed  You can also try sinus rinses with a neti pot or nasal lavage (be sure to use distilled water ) If your symptoms do not improve after 7-10 days, you may need antibiotics because it is more likely to be bacterial at that point  Follow-up with your primary care physician for recheck in 2-3 business days  Go to the ER for sudden severe headache, high fevers, change in vision, seizure activity or anything else that is concerning

## 2022-10-05 NOTE — CARE ANYWHERE EVISITS
Visit Summary for YASMIN GOLD - Gender: Female - Date of Birth: [id number]  Date: [id number] - Duration: 8 minutes  Patient: YASMIN GOLD  Provider: Ann-Marie Farah PA-C    Patient Contact Information  Address  Alyssa Clemens; [address] [address]  [phone]    Visit Topics  Cold [Added By: Self - 2022-10-05]  Headache [Added By: Self - 2022-10-05]    Triage Questions   What is your current physical address in the event of a medical emergency? Answer []  Are you allergic to any medications? Answer []  Are you now or could you be pregnant? Answer []  Do you have any immune system compromise or chronic lung   disease? Answer []  Do you have any vulnerable family members in the home (infant, pregnant, cancer, elderly)? Answer []     Conversation Transcripts  [0A][0A] [Notification] You are connected with Ann-Marie Farah PA-C, Urgent Care [address] [place] [address] is located in [place]  [0A][Notification] YASMIN GOLD has shared health history  James Monsivais  [0A]    Diagnosis  Acute maxillary sinusitis    Procedures  Value: [id number] Code: CPT-4 UNLISTED E&M SERVICE    Medications Prescribed    No prescriptions ordered    Electronically signed by: Ilana Laura(NPI [id number])

## 2022-11-23 ENCOUNTER — TELEPHONE (OUTPATIENT)
Dept: GASTROENTEROLOGY | Facility: CLINIC | Age: 47
End: 2022-11-23

## 2022-11-23 ENCOUNTER — OFFICE VISIT (OUTPATIENT)
Dept: GASTROENTEROLOGY | Facility: CLINIC | Age: 47
End: 2022-11-23

## 2022-11-23 VITALS
BODY MASS INDEX: 20.03 KG/M2 | DIASTOLIC BLOOD PRESSURE: 71 MMHG | SYSTOLIC BLOOD PRESSURE: 101 MMHG | HEIGHT: 66 IN | HEART RATE: 82 BPM | WEIGHT: 124.6 LBS

## 2022-11-23 DIAGNOSIS — K31.84 GASTROPARESIS: ICD-10-CM

## 2022-11-23 DIAGNOSIS — R11.2 NAUSEA AND VOMITING, UNSPECIFIED VOMITING TYPE: ICD-10-CM

## 2022-11-23 DIAGNOSIS — R10.11 RIGHT UPPER QUADRANT ABDOMINAL PAIN: Primary | ICD-10-CM

## 2022-11-23 NOTE — PROGRESS NOTES
[facility] [address] Consultation  Myah Alcala 41 y o  female MRN: [id number]  Encounter: [id number]          ASSESSMENT AND PLAN:      1  Right upper quadrant abdominal pain  -     EGD; Future; Expected date: 11/23/2022    2  Nausea and vomiting, unspecified vomiting type  -     EGD; Future; Expected date: 11/23/2022    3  Gastroparesis  -     EGD; Future; Expected date: 11/23/2022    History of upper abdominal pain mild discomfort right upper quadrant episodes of nausea vomiting, more so during her periods  Some of the symptoms may be from a visceral hypersensitivity/IBS  But the she has not had much nausea vomiting lately  Clinically no evidence of acute abdomen ileus obstruction  Diet discussed, management of gastroparesis reviewed  Would obtain all records  Schedule ultrasound an EGD in the meanwhile         ______________________________________________________________________    HPI:     Patient give longstanding history of gastroparesis, she tells me she does not empty her stomach early on after eating  She also has episodes of nausea vomiting especially when she eats and drink water  May vomit undigested food  Has never vomited any blood  She does not drink any soda coffee fried foods, appetite is fair weight stable  Has knowing mild to moderate discomfort in the upper abdomen more so on the right upper quadrant, cannot associate this with any specific diet activity stress bowel movements or  gyn symptoms  Her bowels are generally regular  Diet medications more than 10 systems reviewed  She thinks she had an EGD colonoscopy done maybe more than 5 years ago, those records are not available at this time  REVIEW OF SYSTEMS:    CONSTITUTIONAL: Denies any fever, chills, rigors, and weight loss  HEENT: No earache or tinnitus  CARDIOVASCULAR: No chest pain or palpitations     RESPIRATORY: Denies any cough, hemoptysis, shortness of breath or dyspnea on exertion  GASTROINTESTINAL: As noted in the History of Present Illness  GENITOURINARY: Denies any hematuria or dysuria  NEUROLOGIC: No dizziness or vertigo  MUSCULOSKELETAL: Denies any joint swellings  SKIN: Denies skin rashes or itching  ENDOCRINE: Denies excessive thirst  Denies intolerance to heat or cold  PSYCHOSOCIAL: Denies depression or anxiety  Denies any recent memory loss  Historical Information   Past Medical History:   Diagnosis Date   • Bleeding gums     Last assessed 5/30/2017    • Elbow fracture     History of x-ray of elbow fracture   • Fatigue     Last assessed 5/30/2017    • Fractured coccyx ([address] )     Resolved 9/7/2015    • Microscopic hematuria     Last assessed 1/2/2018    • Night sweats     Last assesed 5/30/2017    • Paresthesias     Last assessed 5/30/2017    • Post concussion syndrome     Resolved 9/7/2015    • Wrist fracture     History of x-ray wrist fracture      History reviewed  No pertinent surgical history  Social History   Social History     Substance and Sexual Activity   Alcohol Use Yes    Comment: occasionally     Social History     Substance and Sexual Activity   Drug Use No     Social History     Tobacco Use   Smoking Status Former   Smokeless Tobacco Never     Family History   Problem Relation Age of Onset   • Spina bifida Mother    • Diabetes Maternal Grandfather    • Prostate cancer Maternal Grandfather 67   • No Known Problems Sister    • No Known Problems Daughter    • No Known Problems Daughter        Meds/Allergies       Current Outpatient Medications:   •  venlafaxine (EFFEXOR-XR) 37 5 mg 24 hr capsule  •  LORazepam (Ativan) 0 5 mg tablet    Allergies   Allergen Reactions   • Sulfa Antibiotics Hives           Objective     Blood pressure 101/71, pulse 82, height 5' 6" (1 676 m), weight 56 5 kg (124 lb 9 6 oz)  Body mass index is 20 11 kg/m²          PHYSICAL EXAM:      General Appearance:   Alert, cooperative, no distress   HEENT:   Normocephalic, atraumatic, anicteric  Neck:  Supple, symmetrical, trachea midline   Lungs:   Clear to auscultation bilaterally; no rales, rhonchi or wheezing; respirations unlabored    Heart[de-identified]   Regular rate and rhythm; no murmur  Abdomen:   Soft, non-tender, non-distended; normal bowel sounds; no masses, no organomegaly    Genitalia:   Deferred    Rectal:   Deferred    Extremities:  No cyanosis, clubbing or edema    Skin:  No jaundice, rashes, or lesions    Lymph nodes:  No palpable cervical lymphadenopathy        Lab Results:   No visits with results within 1 Day(s) from this visit  Latest known visit with results is:   Appointment on 07/22/2022   Component Date Value   • TSH 3RD GENERATON 07/22/2022 1 493    • Cholesterol 07/22/2022 182    • Triglycerides 07/22/2022 85    • HDL, Direct 07/22/2022 75    • LDL Calculated 07/22/2022 90    • HIV-1/HIV-2 Ab 07/22/2022 Non-Reactive    • Hepatitis C Ab 07/22/2022 Non-reactive    • Vit D, 25-Hydroxy 07/22/2022 30 4    • WBC 07/22/2022 5 91    • RBC 07/22/2022 3 80 (L)    • Hemoglobin 07/22/2022 12 2    • Hematocrit 07/22/2022 37 1    • MCV 07/22/2022 98    • MCH 07/22/2022 32 1    • MCHC 07/22/2022 32 9    • RDW 07/22/2022 12 8    • MPV 07/22/2022 10 2    • Platelets 61/78/3082 307    • nRBC 07/22/2022 0    • Neutrophils Relative 07/22/2022 49    • Immat GRANS % 07/22/2022 0    • Lymphocytes Relative 07/22/2022 38    • Monocytes Relative 07/22/2022 7    • Eosinophils Relative 07/22/2022 5    • Basophils Relative 07/22/2022 1    • Neutrophils Absolute 07/22/2022 2 86    • Immature Grans Absolute 07/22/2022 0 02    • Lymphocytes Absolute 07/22/2022 2 25    • Monocytes Absolute 07/22/2022 0 43    • Eosinophils Absolute 07/22/2022 0 27    • Basophils Absolute 07/22/2022 0 08          Radiology Results:   No results found

## 2022-12-02 ENCOUNTER — OFFICE VISIT (OUTPATIENT)
Dept: FAMILY MEDICINE CLINIC | Facility: OTHER | Age: 47
End: 2022-12-02

## 2022-12-02 VITALS
SYSTOLIC BLOOD PRESSURE: 98 MMHG | RESPIRATION RATE: 18 BRPM | OXYGEN SATURATION: 98 % | DIASTOLIC BLOOD PRESSURE: 68 MMHG | BODY MASS INDEX: 19.67 KG/M2 | HEART RATE: 88 BPM | WEIGHT: 122.4 LBS | HEIGHT: 66 IN | TEMPERATURE: 98 F

## 2022-12-02 DIAGNOSIS — F32.1 MODERATE MAJOR DEPRESSION (HCC): ICD-10-CM

## 2022-12-02 DIAGNOSIS — Z00.00 ANNUAL PHYSICAL EXAM: ICD-10-CM

## 2022-12-02 DIAGNOSIS — F41.9 ANXIETY: Primary | ICD-10-CM

## 2022-12-02 RX ORDER — HYDROXYZINE HYDROCHLORIDE 25 MG/1
25 TABLET, FILM COATED ORAL EVERY 6 HOURS PRN
Qty: 30 TABLET | Refills: 0 | Status: SHIPPED | OUTPATIENT
Start: 2022-12-02

## 2022-12-02 RX ORDER — VENLAFAXINE HYDROCHLORIDE 75 MG/1
75 CAPSULE, EXTENDED RELEASE ORAL
Qty: 30 CAPSULE | Refills: 5 | Status: SHIPPED | OUTPATIENT
Start: 2022-12-02 | End: 2023-01-01

## 2022-12-02 NOTE — PROGRESS NOTES
Alayna JJ    NAME: Jocelyn Sheehan  AGE: 41 y o  SEX: female  : 1975     DATE: 2022     Assessment and Plan:     Problem List Items Addressed This Visit        Other    Anxiety - Primary    Relevant Medications    venlafaxine (EFFEXOR-XR) 75 mg 24 hr capsule    hydrOXYzine HCL (ATARAX) 25 mg tablet    Moderate major depression (HCC)    Relevant Medications    venlafaxine (EFFEXOR-XR) 75 mg 24 hr capsule    hydrOXYzine HCL (ATARAX) 25 mg tablet       Immunizations and preventive care screenings were discussed with patient today  Appropriate education was printed on patient's after visit summary  Counseling:  Alcohol/drug use: discussed moderation in alcohol intake, the recommendations for healthy alcohol use, and avoidance of illicit drug use  · Exercise: the importance of regular exercise/physical activity was discussed  Recommend exercise 3-5 times per week for at least 30 minutes  Return in about 6 months (around 2023), or Recheck mood, for Recheck  Chief Complaint:     Chief Complaint   Patient presents with   • Physical Exam   • Recheck mood      History of Present Illness:     Adult Annual Physical   Patient here for a comprehensive physical exam  The patient reports problems - ongoing generalized abdominal pain more so to the RUQ, described as a dull ache  She is following up with GI for ongoing care  No acute exacerbation and is not worsening at this time  Patient also reports some ongoing mild anxiety and would like to have her medication increased as she goes into the winter months which are a known to cause exacerbations in her anxiety and depression  Diet and Physical Activity  · Diet/Nutrition: well balanced diet  · Exercise: moderate cardiovascular exercise and 3-4 times a week on average        Depression Screening  PHQ-2/9 Depression Screening    Little interest or pleasure in doing things: 0 - not at all  Feeling down, depressed, or hopeless: 1 - several days  Trouble falling or staying asleep, or sleeping too much: 1 - several days  Feeling tired or having little energy: 1 - several days  Poor appetite or overeatin - not at all  Feeling bad about yourself - or that you are a failure or have let yourself or your family down: 1 - several days  Trouble concentrating on things, such as reading the newspaper or watching television: 1 - several days  Moving or speaking so slowly that other people could have noticed  Or the opposite - being so fidgety or restless that you have been moving around a lot more than usual: 0 - not at all  Thoughts that you would be better off dead, or of hurting yourself in some way: 0 - not at all  PHQ-9 Score: 5   PHQ-9 Interpretation: Mild depression        General Health  · Sleep: gets 4-6 hours of sleep on average  · Hearing: normal - bilateral   · Vision: no vision problems  · Dental: regular dental visits  /GYN Health  · Patient is: perimenopausal  · Last menstrual period: 24-- becoming irregular  · Contraceptive method: None  Review of Systems:     Review of Systems   Constitutional: Negative for activity change, appetite change, fatigue and unexpected weight change  HENT: Negative for congestion and rhinorrhea  Eyes: Negative for itching and visual disturbance  Respiratory: Negative for chest tightness, shortness of breath and wheezing  Cardiovascular: Negative for chest pain and palpitations  Gastrointestinal: Negative for abdominal distention, abdominal pain, diarrhea and nausea  Endocrine: Negative for polydipsia and polyuria  Genitourinary: Negative for dysuria and hematuria  Musculoskeletal: Negative for arthralgias and myalgias  Skin: Negative for color change  Neurological: Negative for dizziness, weakness, light-headedness and headaches        Past Medical History:     Past Medical History:   Diagnosis Date   • Bleeding gums     Last assessed 5/30/2017    • Elbow fracture     History of x-ray of elbow fracture   • Fatigue     Last assessed 5/30/2017    • Fractured coccyx ([address] )     Resolved 9/7/2015    • Microscopic hematuria     Last assessed 1/2/2018    • Night sweats     Last assesed 5/30/2017    • Paresthesias     Last assessed 5/30/2017    • Post concussion syndrome     Resolved 9/7/2015    • Wrist fracture     History of x-ray wrist fracture       Past Surgical History:     History reviewed  No pertinent surgical history     Social History:     Social History     Socioeconomic History   • Marital status: /Civil Union     Spouse name: None   • Number of children: 1   • Years of education: None   • Highest education level: None   Occupational History   • None   Tobacco Use   • Smoking status: Former   • Smokeless tobacco: Never   Vaping Use   • Vaping Use: Never used   Substance and Sexual Activity   • Alcohol use: Yes     Comment: occasionally   • Drug use: No   • Sexual activity: Yes     Partners: Male     Birth control/protection: Male Sterilization   Other Topics Concern   • None   Social History Narrative    Education history: College Grad    Former smoker - As per [employer]      Social Determinants of Health     Financial Resource Strain: Not on file   Food Insecurity: Not on file   Transportation Needs: Not on file   Physical Activity: Not on file   Stress: Not on file   Social Connections: Not on file   Intimate Partner Violence: Not on file   Housing Stability: Not on file      Family History:     Family History   Problem Relation Age of Onset   • Spina bifida Mother    • Diabetes Maternal Grandfather    • Prostate cancer Maternal Grandfather 72   • No Known Problems Sister    • No Known Problems Daughter    • No Known Problems Daughter       Current Medications:     Current Outpatient Medications   Medication Sig Dispense Refill   • hydrOXYzine HCL (ATARAX) 25 mg tablet Take 1 tablet (25 mg total) by mouth every 6 (six) hours as needed for anxiety for up to 30 doses 30 tablet 0   • venlafaxine (EFFEXOR-XR) 75 mg 24 hr capsule Take 1 capsule (75 mg total) by mouth daily with breakfast 30 capsule 5   • LORazepam (Ativan) 0 5 mg tablet Take 1 tablet (0 5 mg total) by mouth 2 (two) times a day as needed for anxiety (Patient not taking: Reported on 11/23/2022) 30 tablet 0     No current facility-administered medications for this visit  Allergies: Allergies   Allergen Reactions   • Sulfa Antibiotics Hives      Physical Exam:     BP 98/68   Pulse 88   Temp 98 °F (36 7 °C)   Resp 18   Ht 5' 6" (1 676 m)   Wt 55 5 kg (122 lb 6 4 oz)   SpO2 98%   BMI 19 76 kg/m²     Physical Exam  Vitals and nursing note reviewed  Constitutional:       General: She is not in acute distress  Appearance: Normal appearance  She is well-developed  She is not ill-appearing  HENT:      Head: Normocephalic and atraumatic  Right Ear: External ear normal       Left Ear: External ear normal       Nose: Nose normal  No congestion or rhinorrhea  Eyes:      Conjunctiva/sclera: Conjunctivae normal    Cardiovascular:      Rate and Rhythm: Normal rate and regular rhythm  Heart sounds: No murmur heard  Pulmonary:      Effort: Pulmonary effort is normal  No respiratory distress  Breath sounds: Normal breath sounds  No wheezing, rhonchi or rales  Abdominal:      General: There is no distension  Palpations: Abdomen is soft  There is no mass  Tenderness: There is no abdominal tenderness  There is no guarding  Musculoskeletal:         General: No swelling or tenderness  Cervical back: Neck supple  Right lower leg: No edema  Left lower leg: No edema  Skin:     General: Skin is warm and dry  Capillary Refill: Capillary refill takes less than 2 seconds  Neurological:      Mental Status: She is alert     Psychiatric:         Mood and Affect: Mood and affect normal  Speech: Speech normal          Behavior: Behavior normal           Hilario Hobbs, DO  [facility] [address] [facility] [address]

## 2022-12-02 NOTE — PATIENT INSTRUCTIONS
Sleep aide and potential anxiety relief supplements: Magnesium threonate (300-400 mg; form of magnesium that can cross blood brain barrier), Apigenin (50 mg; derivative of chamomile, turns on chloride channel in the brain suppressing neuronal activity), Theanine (100-400mg; elevates levels of KEYANA, as well as serotonin and dopamine)    Wellness Visit for Adults   AMBULATORY CARE:   A wellness visit  is when you see your healthcare provider to get screened for health problems  Your healthcare provider will also give you advice on how to stay healthy  Write down your questions so you remember to ask them  Ask your healthcare provider how often you should have a wellness visit  What happens at a wellness visit:  Your healthcare provider will ask about your health, and your family history of health problems  This includes high blood pressure, heart disease, and cancer  He or she will ask if you have symptoms that concern you, if you smoke, and about your mood  You may also be asked about your intake of medicines, supplements, food, and alcohol  Any of the following may be done:  · Your weight  will be checked  Your height may also be checked so your body mass index (BMI) can be calculated  Your BMI shows if you are at a healthy weight  · Your blood pressure  and heart rate will be checked  Your temperature may also be checked  · Blood and urine tests  may be done  Blood tests may be done to check your cholesterol levels  Abnormal cholesterol levels increase your risk for heart disease and stroke  You may also need a blood or urine test to check for diabetes if you are at increased risk  Urine tests may be done to look for signs of an infection or kidney disease  · A physical exam  includes checking your heartbeat and lungs with a stethoscope  Your healthcare provider may also check your skin to look for sun damage  · Screening tests  may be recommended   A screening test is done to check for diseases that may not cause symptoms  The screening tests you may need depend on your age, gender, family history, and lifestyle habits  For example, colorectal screening may be recommended if you are 48years old or older  Screening tests you need if you are a woman:   · A Pap smear  is used to screen for cervical cancer  Pap smears are usually done every 3 to 5 years depending on your age  You may need them more often if you have had abnormal Pap smear test results in the past  Ask your healthcare provider how often you should have a Pap smear  · A mammogram  is an x-ray of your breasts to screen for breast cancer  Experts recommend mammograms every 2 years starting at age 48 years  You may need a mammogram at age 52 years or younger if you have an increased risk for breast cancer  Talk to your healthcare provider about when you should start having mammograms and how often you need them  Vaccines you may need:   · Get an influenza vaccine  every year  The influenza vaccine protects you from the flu  Several types of viruses cause the flu  The viruses change over time, so new vaccines are made each year  · Get a tetanus-diphtheria (Td) booster vaccine  every 10 years  This vaccine protects you against tetanus and diphtheria  Tetanus is a severe infection that may cause painful muscle spasms and lockjaw  Diphtheria is a severe bacterial infection that causes a thick covering in the back of your mouth and throat  · Get a human papillomavirus (HPV) vaccine  if you are female and aged 23 to 32 or male 23 to 24 and never received it  This vaccine protects you from HPV infection  HPV is the most common infection spread by sexual contact  HPV may also cause vaginal, penile, and anal cancers  · Get a pneumococcal vaccine  if you are aged 72 years or older  The pneumococcal vaccine is an injection given to protect you from pneumococcal disease  Pneumococcal disease is an infection caused by pneumococcal bacteria   The infection may cause pneumonia, meningitis, or an ear infection  · Get a shingles vaccine  if you are 60 or older, even if you have had shingles before  The shingles vaccine is an injection to protect you from the varicella-zoster virus  This is the same virus that causes chickenpox  Shingles is a painful rash that develops in people who had chickenpox or have been exposed to the virus  How to eat healthy:  My Plate is a model for planning healthy meals  It shows the types and amounts of foods that should go on your plate  Fruits and vegetables make up about half of your plate, and grains and protein make up the other half  A serving of dairy is included on the side of your plate  The amount of calories and serving sizes you need depends on your age, gender, weight, and height  Examples of healthy foods are listed below:  · Eat a variety of vegetables  such as dark green, red, and orange vegetables  You can also include canned vegetables low in sodium (salt) and frozen vegetables without added butter or sauces  · Eat a variety of fresh fruits , canned fruit in 100% juice, frozen fruit, and dried fruit  · Include whole grains  At least half of the grains you eat should be whole grains  Examples include whole-wheat bread, wheat pasta, brown rice, and whole-grain cereals such as oatmeal     · Eat a variety of protein foods such as seafood (fish and shellfish), lean meat, and poultry without skin (turkey and chicken)  Examples of lean meats include pork leg, shoulder, or tenderloin, and beef round, sirloin, tenderloin, and extra lean ground beef  Other protein foods include eggs and egg substitutes, beans, peas, soy products, nuts, and seeds  · Choose low-fat dairy products such as skim or 1% milk or low-fat yogurt, cheese, and cottage cheese  · Limit unhealthy fats  such as butter, hard margarine, and shortening  Exercise:  Exercise at least 30 minutes per day on most days of the week   Some examples of exercise include walking, biking, dancing, and swimming  You can also fit in more physical activity by taking the stairs instead of the elevator or parking farther away from stores  Include muscle strengthening activities 2 days each week  Regular exercise provides many health benefits  It helps you manage your weight, and decreases your risk for type 2 diabetes, heart disease, stroke, and high blood pressure  Exercise can also help improve your mood  Ask your healthcare provider about the best exercise plan for you  General health and safety guidelines:   · Do not smoke  Nicotine and other chemicals in cigarettes and cigars can cause lung damage  Ask your healthcare provider for information if you currently smoke and need help to quit  E-cigarettes or smokeless tobacco still contain nicotine  Talk to your healthcare provider before you use these products  · Limit alcohol  A drink of alcohol is 12 ounces of beer, 5 ounces of wine, or 1½ ounces of liquor  · Lose weight, if needed  Being overweight increases your risk of certain health conditions  These include heart disease, high blood pressure, type 2 diabetes, and certain types of cancer  · Protect your skin  Do not sunbathe or use tanning beds  Use sunscreen with a SPF 15 or higher  Apply sunscreen at least 15 minutes before you go outside  Reapply sunscreen every 2 hours  Wear protective clothing, hats, and sunglasses when you are outside  · Drive safely  Always wear your seatbelt  Make sure everyone in your car wears a seatbelt  A seatbelt can save your life if you are in an accident  Do not use your cell phone when you are driving  This could distract you and cause an accident  Pull over if you need to make a call or send a text message  · Practice safe sex  Use latex condoms if are sexually active and have more than one partner   Your healthcare provider may recommend screening tests for sexually transmitted infections (STIs)  · Wear helmets, lifejackets, and protective gear  Always wear a helmet when you ride a bike or motorcycle, go skiing, or play sports that could cause a head injury  Wear protective equipment when you play sports  Wear a lifejacket when you are on a boat or doing water sports  © Copyright [employer] 2022 Information is for End User's use only and may not be sold, redistributed or otherwise used for commercial purposes  All illustrations and images included in CareNotes® are the copyrighted property of A D A M , Inc  or [facility] Lucita Gallegos   The above information is an  only  It is not intended as medical advice for individual conditions or treatments  Talk to your doctor, nurse or pharmacist before following any medical regimen to see if it is safe and effective for you  Wellness Visit for Adults   AMBULATORY CARE:   A wellness visit  is when you see your healthcare provider to get screened for health problems  Your healthcare provider will also give you advice on how to stay healthy  Write down your questions so you remember to ask them  Ask your healthcare provider how often you should have a wellness visit  What happens at a wellness visit:  Your healthcare provider will ask about your health, and your family history of health problems  This includes high blood pressure, heart disease, and cancer  He or she will ask if you have symptoms that concern you, if you smoke, and about your mood  You may also be asked about your intake of medicines, supplements, food, and alcohol  Any of the following may be done:  · Your weight  will be checked  Your height may also be checked so your body mass index (BMI) can be calculated  Your BMI shows if you are at a healthy weight  · Your blood pressure  and heart rate will be checked  Your temperature may also be checked  · Blood and urine tests  may be done  Blood tests may be done to check your cholesterol levels   Abnormal cholesterol levels increase your risk for heart disease and stroke  You may also need a blood or urine test to check for diabetes if you are at increased risk  Urine tests may be done to look for signs of an infection or kidney disease  · A physical exam  includes checking your heartbeat and lungs with a stethoscope  Your healthcare provider may also check your skin to look for sun damage  · Screening tests  may be recommended  A screening test is done to check for diseases that may not cause symptoms  The screening tests you may need depend on your age, gender, family history, and lifestyle habits  For example, colorectal screening may be recommended if you are 48years old or older  Screening tests you need if you are a woman:   · A Pap smear  is used to screen for cervical cancer  Pap smears are usually done every 3 to 5 years depending on your age  You may need them more often if you have had abnormal Pap smear test results in the past  Ask your healthcare provider how often you should have a Pap smear  · A mammogram  is an x-ray of your breasts to screen for breast cancer  Experts recommend mammograms every 2 years starting at age 48 years  You may need a mammogram at age 52 years or younger if you have an increased risk for breast cancer  Talk to your healthcare provider about when you should start having mammograms and how often you need them  Vaccines you may need:   · Get an influenza vaccine  every year  The influenza vaccine protects you from the flu  Several types of viruses cause the flu  The viruses change over time, so new vaccines are made each year  · Get a tetanus-diphtheria (Td) booster vaccine  every 10 years  This vaccine protects you against tetanus and diphtheria  Tetanus is a severe infection that may cause painful muscle spasms and lockjaw  Diphtheria is a severe bacterial infection that causes a thick covering in the back of your mouth and throat      · Get a human papillomavirus (HPV) vaccine  if you are female and aged 23 to 32 or male 23 to 24 and never received it  This vaccine protects you from HPV infection  HPV is the most common infection spread by sexual contact  HPV may also cause vaginal, penile, and anal cancers  · Get a pneumococcal vaccine  if you are aged 72 years or older  The pneumococcal vaccine is an injection given to protect you from pneumococcal disease  Pneumococcal disease is an infection caused by pneumococcal bacteria  The infection may cause pneumonia, meningitis, or an ear infection  · Get a shingles vaccine  if you are 60 or older, even if you have had shingles before  The shingles vaccine is an injection to protect you from the varicella-zoster virus  This is the same virus that causes chickenpox  Shingles is a painful rash that develops in people who had chickenpox or have been exposed to the virus  How to eat healthy:  My Plate is a model for planning healthy meals  It shows the types and amounts of foods that should go on your plate  Fruits and vegetables make up about half of your plate, and grains and protein make up the other half  A serving of dairy is included on the side of your plate  The amount of calories and serving sizes you need depends on your age, gender, weight, and height  Examples of healthy foods are listed below:  · Eat a variety of vegetables  such as dark green, red, and orange vegetables  You can also include canned vegetables low in sodium (salt) and frozen vegetables without added butter or sauces  · Eat a variety of fresh fruits , canned fruit in 100% juice, frozen fruit, and dried fruit  · Include whole grains  At least half of the grains you eat should be whole grains  Examples include whole-wheat bread, wheat pasta, brown rice, and whole-grain cereals such as oatmeal     · Eat a variety of protein foods such as seafood (fish and shellfish), lean meat, and poultry without skin (turkey and chicken)   Examples of lean meats include pork leg, shoulder, or tenderloin, and beef round, sirloin, tenderloin, and extra lean ground beef  Other protein foods include eggs and egg substitutes, beans, peas, soy products, nuts, and seeds  · Choose low-fat dairy products such as skim or 1% milk or low-fat yogurt, cheese, and cottage cheese  · Limit unhealthy fats  such as butter, hard margarine, and shortening  Exercise:  Exercise at least 30 minutes per day on most days of the week  Some examples of exercise include walking, biking, dancing, and swimming  You can also fit in more physical activity by taking the stairs instead of the elevator or parking farther away from stores  Include muscle strengthening activities 2 days each week  Regular exercise provides many health benefits  It helps you manage your weight, and decreases your risk for type 2 diabetes, heart disease, stroke, and high blood pressure  Exercise can also help improve your mood  Ask your healthcare provider about the best exercise plan for you  General health and safety guidelines:   · Do not smoke  Nicotine and other chemicals in cigarettes and cigars can cause lung damage  Ask your healthcare provider for information if you currently smoke and need help to quit  E-cigarettes or smokeless tobacco still contain nicotine  Talk to your healthcare provider before you use these products  · Limit alcohol  A drink of alcohol is 12 ounces of beer, 5 ounces of wine, or 1½ ounces of liquor  · Lose weight, if needed  Being overweight increases your risk of certain health conditions  These include heart disease, high blood pressure, type 2 diabetes, and certain types of cancer  · Protect your skin  Do not sunbathe or use tanning beds  Use sunscreen with a SPF 15 or higher  Apply sunscreen at least 15 minutes before you go outside  Reapply sunscreen every 2 hours  Wear protective clothing, hats, and sunglasses when you are outside  · Drive safely    Always wear your seatbelt  Make sure everyone in your car wears a seatbelt  A seatbelt can save your life if you are in an accident  Do not use your cell phone when you are driving  This could distract you and cause an accident  Pull over if you need to make a call or send a text message  · Practice safe sex  Use latex condoms if are sexually active and have more than one partner  Your healthcare provider may recommend screening tests for sexually transmitted infections (STIs)  · Wear helmets, lifejackets, and protective gear  Always wear a helmet when you ride a bike or motorcycle, go skiing, or play sports that could cause a head injury  Wear protective equipment when you play sports  Wear a lifejacket when you are on a boat or doing water sports  © Copyright [employer] 2022 Information is for End User's use only and may not be sold, redistributed or otherwise used for commercial purposes  All illustrations and images included in CareNotes® are the copyrighted property of A D A M , Inc  or [facility] Lucita Gallegos   The above information is an  only  It is not intended as medical advice for individual conditions or treatments  Talk to your doctor, nurse or pharmacist before following any medical regimen to see if it is safe and effective for you

## 2022-12-07 ENCOUNTER — VBI (OUTPATIENT)
Dept: ADMINISTRATIVE | Facility: OTHER | Age: 47
End: 2022-12-07

## 2022-12-27 ENCOUNTER — HOSPITAL ENCOUNTER (OUTPATIENT)
Dept: MAMMOGRAPHY | Facility: HOSPITAL | Age: 47
Discharge: HOME | End: 2022-12-27

## 2022-12-27 VITALS — HEIGHT: 66 IN | WEIGHT: 122.36 LBS | BODY MASS INDEX: 19.66 KG/M2

## 2022-12-27 DIAGNOSIS — Z12.31 ENCOUNTER FOR SCREENING MAMMOGRAM FOR MALIGNANT NEOPLASM OF BREAST: ICD-10-CM

## 2023-01-10 ENCOUNTER — VBI (OUTPATIENT)
Dept: ADMINISTRATIVE | Facility: OTHER | Age: 48
End: 2023-01-10

## 2023-02-02 ENCOUNTER — AMB VIDEO VISIT (OUTPATIENT)
Dept: OTHER | Facility: HOSPITAL | Age: 48
End: 2023-02-02

## 2023-02-02 VITALS — RESPIRATION RATE: 16 BRPM

## 2023-02-02 DIAGNOSIS — N39.0 URINARY TRACT INFECTION WITHOUT HEMATURIA, SITE UNSPECIFIED: Primary | ICD-10-CM

## 2023-02-02 RX ORDER — NITROFURANTOIN 25; 75 MG/1; MG/1
100 CAPSULE ORAL 2 TIMES DAILY
Qty: 10 CAPSULE | Refills: 0 | Status: SHIPPED | OUTPATIENT
Start: 2023-02-02 | End: 2023-02-07

## 2023-02-02 NOTE — PATIENT INSTRUCTIONS
Urine culture ordered  Start antibiotic  Take probiotic  Increase oral fluids  Tylenol or Motrin for pain or fever  Azo for bladder spasms  Follow up with PCP if no improvement  Go to ER with abd pain, flank pain or worsening symptoms  Urinary Tract Infection in Women   WHAT YOU NEED TO KNOW:   A urinary tract infection (UTI) is caused by bacteria that get inside your urinary tract  Most bacteria that enter your urinary tract come out when you urinate  If the bacteria stay in your urinary tract, you may get an infection  Your urinary tract includes your kidneys, ureters, bladder, and urethra  Urine is made in your kidneys, and it flows from the ureters to the bladder  Urine leaves the bladder through the urethra  A UTI is more common in your lower urinary tract, which includes your bladder and urethra  DISCHARGE INSTRUCTIONS:   Return to the emergency department if:   You are urinating very little or not at all  You have a high fever with shaking chills  You have side or back pain that gets worse  Contact your healthcare provider if:   You have a fever  You do not feel better after 2 days of taking antibiotics  You are vomiting  You have questions or concerns about your condition or care  Medicines:   Antibiotics  help fight a bacterial infection  Medicines  may be given to decrease pain and burning when you urinate  They will also help decrease the feeling that you need to urinate often  These medicines will make your urine orange or red  Take your medicine as directed  Contact your healthcare provider if you think your medicine is not helping or if you have side effects  Tell him or her if you are allergic to any medicine  Keep a list of the medicines, vitamins, and herbs you take  Include the amounts, and when and why you take them  Bring the list or the pill bottles to follow-up visits  Carry your medicine list with you in case of an emergency    Follow up with your healthcare provider as directed:  Write down your questions so you remember to ask them during your visits  Prevent another UTI:   Empty your bladder often  Urinate and empty your bladder as soon as you feel the need  Do not hold your urine for long periods of time  Wipe from front to back after you urinate or have a bowel movement  This will help prevent germs from getting into your urinary tract through your urethra  Drink liquids as directed  Ask how much liquid to drink each day and which liquids are best for you  You may need to drink more liquids than usual to help flush out the bacteria  Do not drink alcohol, caffeine, or citrus juices  These can irritate your bladder and increase your symptoms  Your healthcare provider may recommend cranberry juice to help prevent a UTI  Urinate after you have sex  This can help flush out bacteria passed during sex  Do not douche or use feminine deodorants  These can change the chemical balance in your vagina  Change sanitary pads or tampons often  This will help prevent germs from getting into your urinary tract  Do pelvic muscle exercises often  Pelvic muscle exercises may help you start and stop urinating  Strong pelvic muscles may help you empty your bladder easier  Squeeze these muscles tightly for 5 seconds like you are trying to hold back urine  Then relax for 5 seconds  Gradually work up to squeezing for 10 seconds  Do 3 sets of 15 repetitions a day, or as directed  © 2017 [address]  Information is for End User's use only and may not be sold, redistributed or otherwise used for commercial purposes  All illustrations and images included in CareNotes® are the copyrighted property of A [employer] A M , Inc  or Marek Sparks  The above information is an  only  It is not intended as medical advice for individual conditions or treatments   Talk to your doctor, nurse or pharmacist before following any medical regimen to see if it is safe and effective for you

## 2023-02-02 NOTE — CARE ANYWHERE EVISITS
Visit Summary for YASMIN GOLD - Gender: Female - Date of Birth: [id number]  Date: [id number] - Duration: 3 minutes  Patient: YASMIN GOLD  Provider: Arash SKAGGS    Patient Contact Information  [place]  [address] [place] [facility] [address]  [phone]    Visit Topics    Triage Questions   What is your current physical address in the event of a medical emergency? Answer []  Are you allergic to any medications? Answer []  Are you now or could you be pregnant? Answer []  Do you have any immune system compromise or chronic lung   disease? Answer []  Do you have any vulnerable family members in the home (infant, pregnant, cancer, elderly)? Answer []     Conversation Transcripts  [0A][0A] [Notification] You are connected with Nathanael Izquierdo, Urgent Care [address] [place] [address] is located in [place]  [0A][Notification] YASMIN GOLD has shared health history  Leanna Blase  [0A]    Diagnosis  Urinary tract infection, site not specified    Procedures  Value: [id number] Code: CPT-4 UNLISTED E&M SERVICE    Medications Prescribed    No prescriptions ordered    Electronically signed by: Nathanael Arce(NPI [id number])

## 2023-02-02 NOTE — PROGRESS NOTES
Video Visit - Verito Chahal 41 y o  female MRN: [id number]    REQUIRED DOCUMENTATION:         1  [facility]  2  Provider located at [address] [place] [id number] [phone]  3  [facility] provider: GILES Castro  4  Identify all parties in room with patient during [facility] visit:  Patient   5  After connecting through [employer], patient was identified by name and date of birth  Patient was then informed that this was a Telemedicine visit and that the exam was being conducted confidentially over secure lines  My office door was closed  No one else was in the room  Patient acknowledged consent and understanding of privacy and security of the Telemedicine visit  I informed the patient that I have reviewed their record in Epic and presented the opportunity for them to ask any questions regarding the visit today  The patient agreed to participate  This is a 41year old female here today for video visit  She states she is having urinary urgency and frequency  Symptoms started this Am  No abd or back pain  No nausea or vomiting  Last UTI was about 6 months ago  She has had them in the past    No fevers  Review of Systems   Constitutional: Negative for activity change, chills and fatigue  Respiratory: Negative  Cardiovascular: Negative  Gastrointestinal: Negative  Genitourinary: Positive for dysuria, frequency and urgency  Neurological: Negative  Psychiatric/Behavioral: Negative  Vitals:    02/02/23 0923   Resp: 16     Physical Exam  Constitutional:       General: She is not in acute distress  Appearance: Normal appearance  She is not ill-appearing or toxic-appearing  HENT:      Head: Normocephalic and atraumatic  Eyes:      Conjunctiva/sclera: Conjunctivae normal    Pulmonary:      Effort: Pulmonary effort is normal  No respiratory distress     Abdominal:      Comments: Denies abd pain    Skin: Comments: No rash on head or neck  Neurological:      Mental Status: She is alert and oriented to person, place, and time  Psychiatric:         Mood and Affect: Mood normal          Behavior: Behavior normal          Thought Content: Thought content normal          Judgment: Judgment normal        Diagnoses and all orders for this visit:    Urinary tract infection without hematuria, site unspecified  -     nitrofurantoin (MACROBID) 100 mg capsule; Take 1 capsule (100 mg total) by mouth 2 (two) times a day for 5 days  -     Urine culture; Future      Patient Instructions     Urine culture ordered  Start antibiotic  Take probiotic  Increase oral fluids  Tylenol or Motrin for pain or fever  Azo for bladder spasms  Follow up with PCP if no improvement  Go to ER with abd pain, flank pain or worsening symptoms  Urinary Tract Infection in Women   WHAT YOU NEED TO KNOW:   A urinary tract infection (UTI) is caused by bacteria that get inside your urinary tract  Most bacteria that enter your urinary tract come out when you urinate  If the bacteria stay in your urinary tract, you may get an infection  Your urinary tract includes your kidneys, ureters, bladder, and urethra  Urine is made in your kidneys, and it flows from the ureters to the bladder  Urine leaves the bladder through the urethra  A UTI is more common in your lower urinary tract, which includes your bladder and urethra  DISCHARGE INSTRUCTIONS:   Return to the emergency department if:   · You are urinating very little or not at all  · You have a high fever with shaking chills  · You have side or back pain that gets worse  Contact your healthcare provider if:   · You have a fever  · You do not feel better after 2 days of taking antibiotics  · You are vomiting  · You have questions or concerns about your condition or care  Medicines:   · Antibiotics  help fight a bacterial infection       · Medicines  may be given to decrease pain and burning when you urinate  They will also help decrease the feeling that you need to urinate often  These medicines will make your urine orange or red  · Take your medicine as directed  Contact your healthcare provider if you think your medicine is not helping or if you have side effects  Tell him or her if you are allergic to any medicine  Keep a list of the medicines, vitamins, and herbs you take  Include the amounts, and when and why you take them  Bring the list or the pill bottles to follow-up visits  Carry your medicine list with you in case of an emergency  Follow up with your healthcare provider as directed:  Write down your questions so you remember to ask them during your visits  Prevent another UTI:   · Empty your bladder often  Urinate and empty your bladder as soon as you feel the need  Do not hold your urine for long periods of time  · Wipe from front to back after you urinate or have a bowel movement  This will help prevent germs from getting into your urinary tract through your urethra  · Drink liquids as directed  Ask how much liquid to drink each day and which liquids are best for you  You may need to drink more liquids than usual to help flush out the bacteria  Do not drink alcohol, caffeine, or citrus juices  These can irritate your bladder and increase your symptoms  Your healthcare provider may recommend cranberry juice to help prevent a UTI  · Urinate after you have sex  This can help flush out bacteria passed during sex  · Do not douche or use feminine deodorants  These can change the chemical balance in your vagina  · Change sanitary pads or tampons often  This will help prevent germs from getting into your urinary tract  · Do pelvic muscle exercises often  Pelvic muscle exercises may help you start and stop urinating  Strong pelvic muscles may help you empty your bladder easier   Squeeze these muscles tightly for 5 seconds like you are trying to hold back urine  Then relax for 5 seconds  Gradually work up to squeezing for 10 seconds  Do 3 sets of 15 repetitions a day, or as directed  © 2017 [address] [employer] Information is for End User's use only and may not be sold, redistributed or otherwise used for commercial purposes  All illustrations and images included in CareNotes® are the copyrighted property of A D A M [employer]  or Marek Sparks  The above information is an  only  It is not intended as medical advice for individual conditions or treatments  Talk to your doctor, nurse or pharmacist before following any medical regimen to see if it is safe and effective for you  Follow up with PCP if not improved, if symptoms are worse, go to the ER

## 2023-03-22 ENCOUNTER — TELEPHONE (OUTPATIENT)
Dept: FAMILY MEDICINE CLINIC | Facility: OTHER | Age: 48
End: 2023-03-22

## 2023-03-27 ENCOUNTER — CLINICAL SUPPORT (OUTPATIENT)
Dept: FAMILY MEDICINE CLINIC | Facility: OTHER | Age: 48
End: 2023-03-27

## 2023-03-27 DIAGNOSIS — Z11.1 ENCOUNTER FOR PPD TEST: Primary | ICD-10-CM

## 2023-03-29 ENCOUNTER — CLINICAL SUPPORT (OUTPATIENT)
Dept: FAMILY MEDICINE CLINIC | Facility: OTHER | Age: 48
End: 2023-03-29

## 2023-03-29 DIAGNOSIS — Z11.1 ENCOUNTER FOR PPD SKIN TEST READING: Primary | ICD-10-CM

## 2023-03-29 LAB
INDURATION: 0 MM
TB SKIN TEST: NEGATIVE

## 2023-05-17 ENCOUNTER — VBI (OUTPATIENT)
Dept: ADMINISTRATIVE | Facility: OTHER | Age: 48
End: 2023-05-17

## 2023-08-17 ENCOUNTER — VBI (OUTPATIENT)
Dept: ADMINISTRATIVE | Facility: OTHER | Age: 48
End: 2023-08-17

## 2024-01-27 ENCOUNTER — AMB VIDEO VISIT (OUTPATIENT)
Dept: OTHER | Facility: HOSPITAL | Age: 49
End: 2024-01-27
Payer: COMMERCIAL

## 2024-01-27 DIAGNOSIS — R30.0 DYSURIA: Primary | ICD-10-CM

## 2024-01-27 PROCEDURE — 99212 OFFICE O/P EST SF 10 MIN: CPT | Performed by: PHYSICIAN ASSISTANT

## 2024-01-27 RX ORDER — NITROFURANTOIN 25; 75 MG/1; MG/1
100 CAPSULE ORAL 2 TIMES DAILY
Qty: 10 CAPSULE | Refills: 0 | Status: SHIPPED | OUTPATIENT
Start: 2024-01-27 | End: 2024-02-01

## 2024-01-27 NOTE — PATIENT INSTRUCTIONS
Urinary Tract Infection in Women   WHAT YOU NEED TO KNOW:   A urinary tract infection (UTI) is caused by bacteria that get inside your urinary tract. Your urinary tract includes your kidneys, ureters, bladder, and urethra. A UTI is more common in your lower urinary tract, which includes your bladder and urethra.       DISCHARGE INSTRUCTIONS:   Return to the emergency department if:   You are urinating very little or not at all.    You have a high fever with shaking chills.    You have side or back pain that gets worse.    Call your doctor if:   You have a fever.    You do not feel better after 2 days of taking antibiotics.    You have new symptoms, such as blood or pus in your urine.    You are vomiting.    You have questions or concerns about your condition or care.    Medicines:   Antibiotics  treat a bacterial infection. If you have UTIs often (called recurrent UTIs), you may be given antibiotics to take regularly. You will be given directions for when and how to use antibiotics. The goal is to prevent UTIs but not cause antibiotic resistance by using antibiotics too often.    Medicines  may be given to decrease pain and burning when you urinate. They will also help decrease the feeling that you need to urinate often. These medicines may make your urine orange or red.    Take your medicine as directed.  Contact your healthcare provider if you think your medicine is not helping or if you have side effects. Tell your provider if you are allergic to any medicine. Keep a list of the medicines, vitamins, and herbs you take. Include the amounts, and when and why you take them. Bring the list or the pill bottles to follow-up visits. Carry your medicine list with you in case of an emergency.    Follow up with your doctor as directed:  Write down your questions so you remember to ask them during your visits.  Prevent another UTI:   Empty your bladder often.  Urinate and empty your bladder as soon as you feel the need. Do  not hold your urine for long periods of time.    Wipe from front to back after you urinate or have a bowel movement.  This will help prevent germs from getting into your urinary tract through your urethra.    Drink liquids as directed.  Ask how much liquid to drink each day and which liquids are best for you. You may need to drink more liquids than usual to help flush out the bacteria. Do not drink alcohol, caffeine, or citrus juices. These can irritate your bladder and increase your symptoms. Your healthcare provider may recommend cranberry juice to help prevent a UTI.    Urinate before and after you have sex.  This can help flush out bacteria passed during sex.    Do not douche or use feminine deodorants.  These can change the chemical balance in your vagina.    Change sanitary pads or tampons often.  This will help prevent germs from getting into your urinary tract.    Talk to your healthcare provider about your birth control method.  You may need to change your method if it is increasing your risk for UTIs.    Wear cotton underwear and clothes that are loose.  Tight pants and nylon underwear can trap moisture and cause bacteria to grow.    Vaginal estrogen may be recommended.  This medicine helps prevent UTIs in women who have gone through menopause or are in nevin-menopause.    Do pelvic muscle exercises often.  Pelvic muscle exercises may help you start and stop urinating. Strong pelvic muscles may help you empty your bladder easier. Squeeze these muscles tightly for 5 seconds like you are trying to hold back urine. Then relax for 5 seconds. Gradually work up to squeezing for 10 seconds. Do 3 sets of 15 repetitions a day, or as directed.    © Copyright [employer] 2023 Information is for End User's use only and may not be sold, redistributed or otherwise used for commercial purposes.  The above information is an  only. It is not intended as medical advice for individual conditions or treatments.  Talk to your doctor, nurse or pharmacist before following any medical regimen to see if it is safe and effective for you.

## 2024-01-27 NOTE — PROGRESS NOTES
Required Documentation:  Encounter provider Savannah Latif PA-C    Provider located at [facility]  VIRTUAL CARE   [address] [facility] [id number]    Identify all parties in room with patient during virtual visit:  No one else    The patient was identified by name and date of birth. Pau Johnson was informed that this is a telemedicine visit and that the visit is being conducted through the [employer] platform. She agrees to proceed..  My office door was closed. No one else was in the room.  She acknowledged consent and understanding of privacy and security of the video platform. The patient has agreed to participate and understands they can discontinue the visit at any time.    Verification of patient location:    Patient is located at home in the following state in which I hold an active license [place]    Patient is aware this is a billable service.     Reason for visit is No chief complaint on file.       Subjective  HPI   Patient states that she has a UTI.  She is having burning, pain, uncomfortable, frequency,  She did a home strip which was positive. She denies fevers, chills, nausea, vomiting, flank pain.    Past Medical History:   Diagnosis Date    Bleeding gums     Last assessed 5/30/2017     Elbow fracture     History of x-ray of elbow fracture    Fatigue     Last assessed 5/30/2017     Fractured coccyx (HCC)     Resolved 9/7/2015     Microscopic hematuria     Last assessed 1/2/2018     Night sweats     Last assesed 5/30/2017     Paresthesias     Last assessed 5/30/2017     Post concussion syndrome     Resolved 9/7/2015     Wrist fracture     History of x-ray wrist fracture        No past surgical history on file.     Allergies   Allergen Reactions    Sulfa Antibiotics Hives       Review of Systems   Constitutional: Negative.    HENT: Negative.     Respiratory: Negative.     Cardiovascular: Negative.    Gastrointestinal: Negative.    Genitourinary:   Positive for dysuria, frequency and urgency. Negative for flank pain.   Musculoskeletal: Negative.    Neurological: Negative.    Psychiatric/Behavioral: Negative.         Video Exam    There were no vitals filed for this visit.    Physical Exam  Constitutional:       General: She is not in acute distress.     Appearance: Normal appearance. She is not ill-appearing, toxic-appearing or diaphoretic.   Pulmonary:      Effort: Pulmonary effort is normal.   Abdominal:      General: Abdomen is flat.      Tenderness: There is no abdominal tenderness. There is no right CVA tenderness, left CVA tenderness, guarding or rebound.   Skin:     General: Skin is dry.   Neurological:      General: No focal deficit present.      Mental Status: She is alert and oriented to person, place, and time.   Psychiatric:         Mood and Affect: Mood normal.         Behavior: Behavior normal.         Visit Time  Total Visit Duration: 5 minutes    Assessment/Plan:    Diagnoses and all orders for this visit:    Dysuria  -     UA w Reflex to Microscopic w Reflex to Culture; Future  -     nitrofurantoin (MACROBID) 100 mg capsule; Take 1 capsule (100 mg total) by mouth 2 (two) times a day for 5 days        Patient Instructions   Urinary Tract Infection in Women   WHAT YOU NEED TO KNOW:   A urinary tract infection (UTI) is caused by bacteria that get inside your urinary tract. Your urinary tract includes your kidneys, ureters, bladder, and urethra. A UTI is more common in your lower urinary tract, which includes your bladder and urethra.       DISCHARGE INSTRUCTIONS:   Return to the emergency department if:   You are urinating very little or not at all.    You have a high fever with shaking chills.    You have side or back pain that gets worse.    Call your doctor if:   You have a fever.    You do not feel better after 2 days of taking antibiotics.    You have new symptoms, such as blood or pus in your urine.    You are vomiting.    You have  questions or concerns about your condition or care.    Medicines:   Antibiotics  treat a bacterial infection. If you have UTIs often (called recurrent UTIs), you may be given antibiotics to take regularly. You will be given directions for when and how to use antibiotics. The goal is to prevent UTIs but not cause antibiotic resistance by using antibiotics too often.    Medicines  may be given to decrease pain and burning when you urinate. They will also help decrease the feeling that you need to urinate often. These medicines may make your urine orange or red.    Take your medicine as directed.  Contact your healthcare provider if you think your medicine is not helping or if you have side effects. Tell your provider if you are allergic to any medicine. Keep a list of the medicines, vitamins, and herbs you take. Include the amounts, and when and why you take them. Bring the list or the pill bottles to follow-up visits. Carry your medicine list with you in case of an emergency.    Follow up with your doctor as directed:  Write down your questions so you remember to ask them during your visits.  Prevent another UTI:   Empty your bladder often.  Urinate and empty your bladder as soon as you feel the need. Do not hold your urine for long periods of time.    Wipe from front to back after you urinate or have a bowel movement.  This will help prevent germs from getting into your urinary tract through your urethra.    Drink liquids as directed.  Ask how much liquid to drink each day and which liquids are best for you. You may need to drink more liquids than usual to help flush out the bacteria. Do not drink alcohol, caffeine, or citrus juices. These can irritate your bladder and increase your symptoms. Your healthcare provider may recommend cranberry juice to help prevent a UTI.    Urinate before and after you have sex.  This can help flush out bacteria passed during sex.    Do not douche or use feminine deodorants.  These  can change the chemical balance in your vagina.    Change sanitary pads or tampons often.  This will help prevent germs from getting into your urinary tract.    Talk to your healthcare provider about your birth control method.  You may need to change your method if it is increasing your risk for UTIs.    Wear cotton underwear and clothes that are loose.  Tight pants and nylon underwear can trap moisture and cause bacteria to grow.    Vaginal estrogen may be recommended.  This medicine helps prevent UTIs in women who have gone through menopause or are in nevin-menopause.    Do pelvic muscle exercises often.  Pelvic muscle exercises may help you start and stop urinating. Strong pelvic muscles may help you empty your bladder easier. Squeeze these muscles tightly for 5 seconds like you are trying to hold back urine. Then relax for 5 seconds. Gradually work up to squeezing for 10 seconds. Do 3 sets of 15 repetitions a day, or as directed.    © Copyright [employer] 2023 Information is for End User's use only and may not be sold, redistributed or otherwise used for commercial purposes.  The above information is an  only. It is not intended as medical advice for individual conditions or treatments. Talk to your doctor, nurse or pharmacist before following any medical regimen to see if it is safe and effective for you.

## 2024-01-27 NOTE — CARE ANYWHERE EVISITS
Visit Summary for YASMIN GOLD - Gender: Female - Date of Birth: 1975  Date: [id number] - Duration: 2 minutes  Patient: YASMIN GOLD  Provider: Savannah Latif PA-C    Patient Contact Information  Address  [address] [place] [address]; [place] [address]  [phone]    Visit Topics    Triage Questions   What is your current physical address in the event of a medical emergency? Answer []  Are you allergic to any medications? Answer []  Are you now or could you be pregnant? Answer []  Do you have any immune system compromise or chronic lung   disease? Answer []  Do you have any vulnerable family members in the home (infant, pregnant, cancer, elderly)? Answer []     Conversation Transcripts  [0A][0A] [Notification] You are connected with Savannah Latif PA-C, Urgent Care Specialist.[0A][Notification] YASMIN GOLD is located in [place].[0A][Notification] YASMIN GOLD has shared health history...[0A]    Diagnosis  Dysuria    Procedures  Value: [id number] Code: CPT-4 UNLISTED E&M SERVICE    Medications Prescribed    No prescriptions ordered    Electronically signed by: Savannah Latif PA-C(NPI [id number])

## 2024-03-14 ENCOUNTER — AMB VIDEO VISIT (OUTPATIENT)
Dept: OTHER | Facility: HOSPITAL | Age: 49
End: 2024-03-14
Payer: COMMERCIAL

## 2024-03-14 DIAGNOSIS — N39.0 URINARY TRACT INFECTION WITHOUT HEMATURIA, SITE UNSPECIFIED: Primary | ICD-10-CM

## 2024-03-14 PROCEDURE — 99212 OFFICE O/P EST SF 10 MIN: CPT | Performed by: NURSE PRACTITIONER

## 2024-03-14 RX ORDER — NITROFURANTOIN 25; 75 MG/1; MG/1
100 CAPSULE ORAL 2 TIMES DAILY
Qty: 10 CAPSULE | Refills: 0 | Status: SHIPPED | OUTPATIENT
Start: 2024-03-14 | End: 2024-03-19

## 2024-03-14 NOTE — CARE ANYWHERE EVISITS
Visit Summary for YASMIN GOLD - Gender: Female - Date of Birth: 1975  Date: [id number] - Duration: 3 minutes  Patient: YASMIN GOLD  Provider: Nathanael SKAGGS    Patient Contact Information  Address  [address] [place] [address]; [place] [address]  [phone]    Visit Topics  Uti  [Added By: Self - 2024-03-14]    Triage Questions   What is your current physical address in the event of a medical emergency? Answer []  Are you allergic to any medications? Answer []  Are you now or could you be pregnant? Answer []  Do you have any immune system compromise or chronic lung   disease? Answer []  Do you have any vulnerable family members in the home (infant, pregnant, cancer, elderly)? Answer []     Conversation Transcripts  [0A][0A] [Notification] You are connected with Nathanael SKAGGS, Urgent Care Specialist.[0A][Notification] YASMIN GOLD is located in [place].[0A][Notification] YASMIN GOLD has shared health history...[0A]    Diagnosis  Urinary tract infection, site not specified    Procedures  Value: [id number] Code: CPT-4 UNLISTED E&M SERVICE    Medications Prescribed    No prescriptions ordered    Electronically signed by: Nathanael Burkett(NPI [id number])

## 2024-03-14 NOTE — PATIENT INSTRUCTIONS
UA ordered with reflux to culture.   Start antibiotic. Take probiotic.  Increase oral fluids.  Tylenol or Motrin for pain or fever.  Azo for bladder spasms.  Follow up with PCP if no improvement.  Go to ER with abd pain, flank pain or worsening symptoms.       Urinary Tract Infection in Women   WHAT YOU NEED TO KNOW:   A urinary tract infection (UTI) is caused by bacteria that get inside your urinary tract. Most bacteria that enter your urinary tract come out when you urinate. If the bacteria stay in your urinary tract, you may get an infection. Your urinary tract includes your kidneys, ureters, bladder, and urethra. Urine is made in your kidneys, and it flows from the ureters to the bladder. Urine leaves the bladder through the urethra. A UTI is more common in your lower urinary tract, which includes your bladder and urethra.        DISCHARGE INSTRUCTIONS:   Return to the emergency department if:   You are urinating very little or not at all.    You have a high fever with shaking chills.     You have side or back pain that gets worse.  Contact your healthcare provider if:   You have a fever.    You do not feel better after 2 days of taking antibiotics.    You are vomiting.     You have questions or concerns about your condition or care.  Medicines:   Antibiotics  help fight a bacterial infection.     Medicines  may be given to decrease pain and burning when you urinate. They will also help decrease the feeling that you need to urinate often. These medicines will make your urine orange or red.    Take your medicine as directed.  Contact your healthcare provider if you think your medicine is not helping or if you have side effects. Tell him or her if you are allergic to any medicine. Keep a list of the medicines, vitamins, and herbs you take. Include the amounts, and when and why you take them. Bring the list or the pill bottles to follow-up visits. Carry your medicine list with you in case of an emergency.  Follow  up with your healthcare provider as directed:  Write down your questions so you remember to ask them during your visits.   Prevent another UTI:   Empty your bladder often.  Urinate and empty your bladder as soon as you feel the need. Do not hold your urine for long periods of time.    Wipe from front to back after you urinate or have a bowel movement.  This will help prevent germs from getting into your urinary tract through your urethra.    Drink liquids as directed.  Ask how much liquid to drink each day and which liquids are best for you. You may need to drink more liquids than usual to help flush out the bacteria. Do not drink alcohol, caffeine, or citrus juices. These can irritate your bladder and increase your symptoms. Your healthcare provider may recommend cranberry juice to help prevent a UTI.    Urinate after you have sex.  This can help flush out bacteria passed during sex.    Do not douche or use feminine deodorants.  These can change the chemical balance in your vagina.    Change sanitary pads or tampons often.  This will help prevent germs from getting into your urinary tract.     Do pelvic muscle exercises often.  Pelvic muscle exercises may help you start and stop urinating. Strong pelvic muscles may help you empty your bladder easier. Squeeze these muscles tightly for 5 seconds like you are trying to hold back urine. Then relax for 5 seconds. Gradually work up to squeezing for 10 seconds. Do 3 sets of 15 repetitions a day, or as directed.  © 2017 [employer] Information is for End User's use only and may not be sold, redistributed or otherwise used for commercial purposes. All illustrations and images included in CareNotes® are the copyrighted property of [employer] Inc. or [employer].  The above information is an  only. It is not intended as medical advice for individual conditions or treatments. Talk to your doctor, nurse or pharmacist before following  any medical regimen to see if it is safe and effective for you.

## 2024-03-14 NOTE — PROGRESS NOTES
Required Documentation:  Encounter provider GILES Moscoso    Provider located at [facility]  VIRTUAL CARE   [address] [id number]    Identify all parties in room with patient during virtual visit:  [employer]     The patient was identified by name and date of birth. Pau Johnson was informed that this is a telemedicine visit and that the visit is being conducted through the [employer] platform. She agrees to proceed..  My office door was closed. No one else was in the room.  She acknowledged consent and understanding of privacy and security of the video platform. The patient has agreed to participate and understands they can discontinue the visit at any time.    Verification of patient location:work    Patient is located at Other in the following state in which I hold an active license [place]    Patient is aware this is a billable service. work    Reason for visit is No chief complaint on file.       Subjective  This is a 48 year old female here today for video visit.  She is is burning, urgency and frequency.  She denies any fever body aches or chills.  She denies any back pain or abd.  Last UTI was about 2 months ago.  She states she gets them around her period.           Past Medical History:   Diagnosis Date    Bleeding gums     Last assessed 5/30/2017     Elbow fracture     History of x-ray of elbow fracture    Fatigue     Last assessed 5/30/2017     Fractured coccyx (HCC)     Resolved 9/7/2015     Microscopic hematuria     Last assessed 1/2/2018     Night sweats     Last assesed 5/30/2017     Paresthesias     Last assessed 5/30/2017     Post concussion syndrome     Resolved 9/7/2015     Wrist fracture     History of x-ray wrist fracture        No past surgical history on file.     Allergies   Allergen Reactions    Sulfa Antibiotics Hives       Review of Systems   Constitutional:  Negative for activity change, chills, fatigue and fever.    Respiratory: Negative.     Cardiovascular: Negative.    Gastrointestinal:  Negative for abdominal pain.   Genitourinary:  Positive for dysuria, frequency and urgency.   Psychiatric/Behavioral: Negative.         Video Exam    There were no vitals filed for this visit.    Physical Exam  Constitutional:       General: She is not in acute distress.     Appearance: Normal appearance. She is not ill-appearing or toxic-appearing.   Pulmonary:      Effort: Pulmonary effort is normal. No respiratory distress.   Abdominal:      Tenderness: There is no abdominal tenderness.   Neurological:      Mental Status: She is alert and oriented to person, place, and time.   Psychiatric:         Mood and Affect: Mood normal.         Behavior: Behavior normal.         Thought Content: Thought content normal.         Judgment: Judgment normal.         Visit Time  Total Visit Duration: 7 minutes    Assessment/Plan:    Diagnoses and all orders for this visit:    Urinary tract infection without hematuria, site unspecified  -     UA w Reflex to Microscopic w Reflex to Culture; Future  -     nitrofurantoin (MACROBID) 100 mg capsule; Take 1 capsule (100 mg total) by mouth 2 (two) times a day for 5 days        Patient Instructions   UA ordered with reflux to culture.   Start antibiotic. Take probiotic.  Increase oral fluids.  Tylenol or Motrin for pain or fever.  Azo for bladder spasms.  Follow up with PCP if no improvement.  Go to ER with abd pain, flank pain or worsening symptoms.       Urinary Tract Infection in Women   WHAT YOU NEED TO KNOW:   A urinary tract infection (UTI) is caused by bacteria that get inside your urinary tract. Most bacteria that enter your urinary tract come out when you urinate. If the bacteria stay in your urinary tract, you may get an infection. Your urinary tract includes your kidneys, ureters, bladder, and urethra. Urine is made in your kidneys, and it flows from the ureters to the bladder. Urine leaves the  bladder through the urethra. A UTI is more common in your lower urinary tract, which includes your bladder and urethra.        DISCHARGE INSTRUCTIONS:   Return to the emergency department if:   You are urinating very little or not at all.    You have a high fever with shaking chills.     You have side or back pain that gets worse.  Contact your healthcare provider if:   You have a fever.    You do not feel better after 2 days of taking antibiotics.    You are vomiting.     You have questions or concerns about your condition or care.  Medicines:   Antibiotics  help fight a bacterial infection.     Medicines  may be given to decrease pain and burning when you urinate. They will also help decrease the feeling that you need to urinate often. These medicines will make your urine orange or red.    Take your medicine as directed.  Contact your healthcare provider if you think your medicine is not helping or if you have side effects. Tell him or her if you are allergic to any medicine. Keep a list of the medicines, vitamins, and herbs you take. Include the amounts, and when and why you take them. Bring the list or the pill bottles to follow-up visits. Carry your medicine list with you in case of an emergency.  Follow up with your healthcare provider as directed:  Write down your questions so you remember to ask them during your visits.   Prevent another UTI:   Empty your bladder often.  Urinate and empty your bladder as soon as you feel the need. Do not hold your urine for long periods of time.    Wipe from front to back after you urinate or have a bowel movement.  This will help prevent germs from getting into your urinary tract through your urethra.    Drink liquids as directed.  Ask how much liquid to drink each day and which liquids are best for you. You may need to drink more liquids than usual to help flush out the bacteria. Do not drink alcohol, caffeine, or citrus juices. These can irritate your bladder and increase  your symptoms. Your healthcare provider may recommend cranberry juice to help prevent a UTI.    Urinate after you have sex.  This can help flush out bacteria passed during sex.    Do not douche or use feminine deodorants.  These can change the chemical balance in your vagina.    Change sanitary pads or tampons often.  This will help prevent germs from getting into your urinary tract.     Do pelvic muscle exercises often.  Pelvic muscle exercises may help you start and stop urinating. Strong pelvic muscles may help you empty your bladder easier. Squeeze these muscles tightly for 5 seconds like you are trying to hold back urine. Then relax for 5 seconds. Gradually work up to squeezing for 10 seconds. Do 3 sets of 15 repetitions a day, or as directed.  © 2017 [employer] Information is for End User's use only and may not be sold, redistributed or otherwise used for commercial purposes. All illustrations and images included in CareNotes® are the copyrighted property of [employer] or [employer].  The above information is an  only. It is not intended as medical advice for individual conditions or treatments. Talk to your doctor, nurse or pharmacist before following any medical regimen to see if it is safe and effective for you.

## 2024-04-01 ENCOUNTER — OFFICE VISIT (OUTPATIENT)
Dept: FAMILY MEDICINE CLINIC | Facility: OTHER | Age: 49
End: 2024-04-01

## 2024-04-01 VITALS
TEMPERATURE: 98 F | BODY MASS INDEX: 19.8 KG/M2 | OXYGEN SATURATION: 97 % | WEIGHT: 123.2 LBS | RESPIRATION RATE: 16 BRPM | DIASTOLIC BLOOD PRESSURE: 80 MMHG | SYSTOLIC BLOOD PRESSURE: 118 MMHG | HEART RATE: 51 BPM | HEIGHT: 66 IN

## 2024-04-01 DIAGNOSIS — F32.1 MODERATE MAJOR DEPRESSION (HCC): ICD-10-CM

## 2024-04-01 DIAGNOSIS — Z13.228 SCREENING FOR METABOLIC DISORDER: ICD-10-CM

## 2024-04-01 DIAGNOSIS — F41.9 ANXIETY: ICD-10-CM

## 2024-04-01 DIAGNOSIS — Z12.31 ENCOUNTER FOR SCREENING MAMMOGRAM FOR MALIGNANT NEOPLASM OF BREAST: ICD-10-CM

## 2024-04-01 DIAGNOSIS — Z12.4 CERVICAL CANCER SCREENING: ICD-10-CM

## 2024-04-01 DIAGNOSIS — Z00.00 ANNUAL PHYSICAL EXAM: Primary | ICD-10-CM

## 2024-04-01 DIAGNOSIS — Z78.9 ALCOHOL USE: ICD-10-CM

## 2024-04-01 RX ORDER — VENLAFAXINE HYDROCHLORIDE 75 MG/1
75 CAPSULE, EXTENDED RELEASE ORAL
Qty: 30 CAPSULE | Refills: 5 | Status: SHIPPED | OUTPATIENT
Start: 2024-04-01 | End: 2024-09-28

## 2024-04-01 NOTE — PROGRESS NOTES
Depression Screening Follow-up Plan: Patient's depression screening was positive with a PHQ-2 score of . Their PHQ-9 score

## 2024-04-01 NOTE — PROGRESS NOTES
ADULT ANNUAL PHYSICAL  [facility]    NAME: Pau Johnson  AGE: 48 y.o. SEX: female  : 1975     DATE: 4/15/2024     Assessment and Plan:     Problem List Items Addressed This Visit          Behavioral Health    Anxiety    Relevant Medications    venlafaxine (EFFEXOR-XR) 75 mg 24 hr capsule    Other Relevant Orders    TSH + Free T4    Moderate major depression (HCC)     - History of depression previously controlled for with Effexor   - Patient noting decrease in mood of late for no specified reason for the last few months  - Had stopped taking her antidepressant medication for awhile due to improved mood   - PHQ-9 in office today at 13     Plan  - Restart venlafaxine   - F/u in 1 month to recheck mood   - Encourage patient to continue therapy          Relevant Medications    venlafaxine (EFFEXOR-XR) 75 mg 24 hr capsule    Other Relevant Orders    TSH + Free T4    Alcohol use     - Patient reports drinking 3 bottles of wine per week  - AUDIT-C score: 5     Plan  - Advised patient to reduce alcohol intake to about 1 glass of wine per day  - Encourage patient to continue counseling follow up  - CMP         Relevant Orders    Comprehensive metabolic panel     Other Visit Diagnoses       Annual physical exam    -  Primary    Cervical cancer screening        Relevant Orders    Conventional pap smear, screening    Ambulatory Referral to Obstetrics / Gynecology    Encounter for screening mammogram for malignant neoplasm of breast        Relevant Orders    Mammo screening bilateral w 3d & cad    Screening for metabolic disorder        Relevant Orders    Comprehensive metabolic panel    Lipid panel    Hemoglobin A1C            PHQ-9 score of 13 (moderate).   Drinking about 3 bottles of wine a week.     Immunizations and preventive care screenings were discussed with patient today. Appropriate education was printed on patient's after visit  summary.    Counseling:  Alcohol/drug use: discussed moderation in alcohol intake, the recommendations for healthy alcohol use, and avoidance of illicit drug use.  Exercise: the importance of regular exercise/physical activity was discussed. Recommend exercise 3-5 times per week for at least 30 minutes.       Depression Screening and Follow-up Plan: Patient's depression screening was positive with a PHQ-9 score of 13.       Nutrition Assessment and Intervention:     Nutrition patient handout reviewed with patient      Physical Activity Assessment and Intervention:    Physical Activity patient handout reviewed with patient      Emotional and Mental Well-being, Sleep, Connectedness Assessment and Intervention:    Sleep/stress assessment performed    Depression and anxiety screening performed and reviewed    PHQ-9 or GAD7 performed for initial evaluation or follow-up      Tobacco and Toxic Substance Assessment and Intervention:     Tobacco use screening performed    Alcohol and drug use screening performed    ]    Return in about 1 year (around 2025) for Annual physical.     Chief Complaint:     Chief Complaint   Patient presents with    Physical Exam     Refill antidepressant       History of Present Illness:   48 yoF PMHx of       Adult Annual Physical   Patient here for a comprehensive physical exam. The patient reports problems - increase in anxiety/depression .    Diet and Physical Activity  Diet/Nutrition: well balanced diet and consuming 3-5 servings of fruits/vegetables daily.   Exercise: walking.      Depression Screening  PHQ-2/9 Depression Screening    Little interest or pleasure in doing things: 1 - several days  Feeling down, depressed, or hopeless: 2 - more than half the days  Trouble falling or staying asleep, or sleeping too much: 3 - nearly every day  Feeling tired or having little energy: 2 - more than half the days  Poor appetite or overeatin - not at all  Feeling bad about yourself - or that  you are a failure or have let yourself or your family down: 2 - more than half the days  Trouble concentrating on things, such as reading the newspaper or watching television: 2 - more than half the days  Moving or speaking so slowly that other people could have noticed. Or the opposite - being so fidgety or restless that you have been moving around a lot more than usual: 1 - several days  Thoughts that you would be better off dead, or of hurting yourself in some way: 0 - not at all  PHQ-9 Score: 13  PHQ-9 Interpretation: Moderate depression       General Health  Sleep: sleeps well. 2x a month waking up with racing thoughts/anxiety.   Hearing: normal - bilateral.  Vision: no vision problems.   Dental: regular dental visits.       /GYN Health  Follows with gynecology? no   Patient is: perimenopausal  Last menstrual period: 3/12/24  Contraceptive method:  none .    Advanced Care Planning  Do you have an advanced directive? no  Do you have a durable medical power of ? no  ACP document given to the patient? no     Review of Systems:     Review of Systems   Constitutional:  Positive for fatigue. Negative for activity change and appetite change.   HENT:  Negative for congestion and rhinorrhea.    Eyes:  Negative for visual disturbance.   Respiratory:  Negative for chest tightness and shortness of breath.    Cardiovascular:  Negative for chest pain and palpitations.   Gastrointestinal:  Negative for constipation and diarrhea.   Endocrine: Negative for polydipsia and polyuria.   Genitourinary:  Negative for dysuria.   Musculoskeletal:  Negative for arthralgias and myalgias.   Skin:  Negative for color change.   Neurological:  Negative for dizziness, light-headedness and headaches.   Psychiatric/Behavioral:  Positive for dysphoric mood (at times). Negative for agitation, hallucinations, sleep disturbance and suicidal ideas. The patient is nervous/anxious.       Past Medical History:     Past Medical History:    Diagnosis Date    Bleeding gums     Last assessed 5/30/2017     Elbow fracture     History of x-ray of elbow fracture    Fatigue     Last assessed 5/30/2017     Fractured coccyx (HCC)     Resolved 9/7/2015     Microscopic hematuria     Last assessed 1/2/2018     Night sweats     Last assesed 5/30/2017     Paresthesias     Last assessed 5/30/2017     Post concussion syndrome     Resolved 9/7/2015     Wrist fracture     History of x-ray wrist fracture       Past Surgical History:     History reviewed. No pertinent surgical history.   Social History:     Social History     Socioeconomic History    Marital status: /Civil Union     Spouse name: None    Number of children: 1    Years of education: None    Highest education level: None   Occupational History    None   Tobacco Use    Smoking status: Former    Smokeless tobacco: Never   Vaping Use    Vaping status: Never Used   Substance and Sexual Activity    Alcohol use: Yes     Comment: occasionally    Drug use: No    Sexual activity: Yes     Partners: Male     Birth control/protection: Male Sterilization   Other Topics Concern    None   Social History Narrative    Education history: College Grad    [facility]      Social Determinants of Health     Financial Resource Strain: Not on file   Food Insecurity: Not on file   Transportation Needs: Not on file   Physical Activity: Not on file   Stress: Not on file   Social Connections: Not on file   Intimate Partner Violence: Not on file   Housing Stability: Not on file      Family History:     Family History   Problem Relation Age of Onset    Spina bifida Mother     No Known Problems Sister     No Known Problems Daughter     No Known Problems Daughter     Diabetes Maternal Grandfather     Prostate cancer Maternal Grandfather 72    No Known Problems Paternal Aunt       Current Medications:     Current Outpatient Medications   Medication Sig Dispense Refill    venlafaxine (EFFEXOR-XR) 75 mg 24 hr  "capsule Take 1 capsule (75 mg total) by mouth daily with breakfast 30 capsule 5    hydrOXYzine HCL (ATARAX) 25 mg tablet Take 1 tablet (25 mg total) by mouth every 6 (six) hours as needed for anxiety for up to 30 doses (Patient not taking: Reported on 4/1/2024) 30 tablet 0     No current facility-administered medications for this visit.      Allergies:     Allergies   Allergen Reactions    Sulfa Antibiotics Hives      Physical Exam:     /80   Pulse (!) 51   Temp 98 °F (36.7 °C)   Resp 16   Ht 5' 6\" (1.676 m)   Wt 55.9 kg (123 lb 3.2 oz)   SpO2 97%   BMI 19.89 kg/m²     Physical Exam  Vitals and nursing note reviewed.   Constitutional:       General: She is not in acute distress.     Appearance: Normal appearance. She is well-developed. She is not ill-appearing.   HENT:      Head: Normocephalic and atraumatic.      Right Ear: External ear normal.      Left Ear: External ear normal.      Nose: Nose normal.   Eyes:      Conjunctiva/sclera: Conjunctivae normal.   Cardiovascular:      Rate and Rhythm: Normal rate and regular rhythm.      Heart sounds: No murmur heard.  Pulmonary:      Effort: Pulmonary effort is normal. No respiratory distress.      Breath sounds: Normal breath sounds.   Abdominal:      Palpations: Abdomen is soft.      Tenderness: There is no abdominal tenderness.   Musculoskeletal:         General: No swelling.      Cervical back: Neck supple.      Right lower leg: No edema.      Left lower leg: No edema.   Skin:     General: Skin is warm and dry.      Capillary Refill: Capillary refill takes less than 2 seconds.   Neurological:      Mental Status: She is alert.   Psychiatric:         Mood and Affect: Mood normal.          Tomas Walker DO  [facility]"

## 2024-04-15 PROBLEM — F10.90 ALCOHOL USE: Status: ACTIVE | Noted: 2024-04-15

## 2024-04-15 PROBLEM — Z78.9 ALCOHOL USE: Status: ACTIVE | Noted: 2024-04-15

## 2024-04-15 NOTE — ASSESSMENT & PLAN NOTE
- History of depression previously controlled for with Effexor   - Patient noting decrease in mood of late for no specified reason for the last few months  - Had stopped taking her antidepressant medication for awhile due to improved mood   - PHQ-9 in office today at 13     Plan  - Restart venlafaxine   - F/u in 1 month to recheck mood   - Encourage patient to continue therapy

## 2024-04-15 NOTE — ASSESSMENT & PLAN NOTE
- Patient reports drinking 3 bottles of wine per week  - AUDIT-C score: 5     Plan  - Advised patient to reduce alcohol intake to about 1 glass of wine per day  - Encourage patient to continue counseling follow up  - CMP

## 2024-04-25 NOTE — PROGRESS NOTES
Called patient regarding resumption of her antidepressant medication after having a high PHQ-9 score at last office visit. Patient reports she is doing well at this time and denies any symptoms of depression. She states since she is doing well on the medication and is without any concerning symptoms does not feel the need to come in for further evaluation. Expressed to the patient should she have any worsening symptoms of depression to not hesitate to reach out to our offices.

## 2024-05-03 LAB
ALBUMIN SERPL-MCNC: 4.9 G/DL (ref 3.6–5.1)
ALBUMIN/GLOB SERPL: 1.8 (CALC) (ref 1–2.5)
ALP SERPL-CCNC: 46 U/L (ref 31–125)
ALT SERPL-CCNC: 14 U/L (ref 6–29)
AST SERPL-CCNC: 19 U/L (ref 10–35)
BILIRUB SERPL-MCNC: 1.9 MG/DL (ref 0.2–1.2)
BUN SERPL-MCNC: 18 MG/DL (ref 7–25)
BUN/CREAT SERPL: ABNORMAL (CALC) (ref 6–22)
CALCIUM SERPL-MCNC: 9.6 MG/DL (ref 8.6–10.2)
CHLORIDE SERPL-SCNC: 98 MMOL/L (ref 98–110)
CHOLEST SERPL-MCNC: 177 MG/DL
CHOLEST/HDLC SERPL: 2.5 (CALC)
CO2 SERPL-SCNC: 25 MMOL/L (ref 20–32)
CREAT SERPL-MCNC: 0.94 MG/DL (ref 0.5–0.99)
GFR/BSA.PRED SERPLBLD CYS-BASED-ARV: 76 ML/MIN/1.73M2
GLOBULIN SER CALC-MCNC: 2.7 G/DL (CALC) (ref 1.9–3.7)
GLUCOSE SERPL-MCNC: 94 MG/DL (ref 65–99)
HBA1C MFR BLD: 5.3 % OF TOTAL HGB
HDLC SERPL-MCNC: 70 MG/DL
LDLC SERPL CALC-MCNC: 89 MG/DL (CALC)
NONHDLC SERPL-MCNC: 107 MG/DL (CALC)
POTASSIUM SERPL-SCNC: 4.9 MMOL/L (ref 3.5–5.3)
PROT SERPL-MCNC: 7.6 G/DL (ref 6.1–8.1)
SODIUM SERPL-SCNC: 132 MMOL/L (ref 135–146)
T4 FREE SERPL-MCNC: 1.1 NG/DL (ref 0.8–1.8)
TRIGL SERPL-MCNC: 86 MG/DL
TSH SERPL-ACNC: 1.13 MIU/L

## 2024-05-06 ENCOUNTER — NURSE TRIAGE (OUTPATIENT)
Age: 49
End: 2024-05-06

## 2024-05-06 NOTE — TELEPHONE ENCOUNTER
Regarding: abd pain, elevated liver enzymes  ----- Message from Cherelle Wallace sent at 5/6/2024 12:18 PM EDT -----  Pt calling stating she is experiencing abd pain. Pt also received lab results with elevated liver enzymes.

## 2024-05-06 NOTE — TELEPHONE ENCOUNTER
Please call and inform patient she can do over-the-counter Nexium and she should avoid caffeine,  chocolate, carbonated beverages, fried spicy fatty foods, and tomato based products.  Tell her to remain sitting upright for 1 hour after eating and not to eat 2 to 3 hours before she goes to bed.  Patient should sleep with head of bed elevated on multiple pillows.  If pain gets severe is associated with nausea and vomiting and fever she should go to the emergency room for further evaluation.  She can follow these recommendations until able to be seen in the office and scheduled for EGD for further evaluation.  Thank you

## 2024-05-06 NOTE — TELEPHONE ENCOUNTER
I called and spoke to patient and went over recommendations given by Letha SOSA. She verbalized understanding. I also put patient on wait list to possibly get in sooner for an appt.

## 2024-05-06 NOTE — TELEPHONE ENCOUNTER
"Last ov 11/23/22 Dr. Monterroso, Procedure colonoscopy 4/812/18, currently scheduled for visit 9/5/24 with Dr. Monterroso.   Patient in to see pcp 4/1/24 labs ordered and completed. Patient notes elevated bilirubin 1.9. She states she continues with intermittent RUQ pain as at previous visit. She did not complete us of abdomen and EGD 1/2023 (cancelled noting patient would call back to reschedule) as ordered previously. She does note pain seems worse after eating. She was taking OTC Pepcid without any relief. I did advise bland diet, hydration. Report to ER if pain is severe. Currently only urgent appointments available. Please review/advise.    Reason for Disposition   MODERATE pain (e.g., interferes with normal activities that comes and goes (cramps) lasts > 24 hours (Exception: pain with Vomiting or Diarrhea - see that Protocol)    Answer Assessment - Initial Assessment Questions  1. LOCATION: \"Where does it hurt?\"       RUQ  2. RADIATION: \"Does the pain shoot anywhere else?\" (e.g., chest, back)      back  3. ONSET: \"When did the pain begin?\" (e.g., minutes, hours or days ago)       Ongoing for years  4. SUDDEN: \"Gradual or sudden onset?\"      Seems to be worsening off/on  5. PATTERN \"Does the pain come and go, or is it constant?\"     - If constant: \"Is it getting better, staying the same, or worsening?\"       (Note: Constant means the pain never goes away completely; most serious pain is constant and it progresses)      - If intermittent: \"How long does it last?\" \"Do you have pain now?\"      (Note: Intermittent means the pain goes away completely between bouts)      intermittent  6. SEVERITY: \"How bad is the pain?\"  (e.g., Scale 1-10; mild, moderate, or severe)    - MILD (1-3): doesn't interfere with normal activities, abdomen soft and not tender to touch     - MODERATE (4-7): interferes with normal activities or awakens from sleep, tender to touch     - SEVERE (8-10): excruciating pain, doubled over, unable to do any " "normal activities       Moderate at times  7. RECURRENT SYMPTOM: \"Have you ever had this type of stomach pain before?\" If Yes, ask: \"When was the last time?\" and \"What happened that time?\"       Yes   8. CAUSE: \"What do you think is causing the stomach pain?\"      Unknown but notes worse after earing  9. RELIEVING/AGGRAVATING FACTORS: \"What makes it better or worse?\" (e.g., movement, antacids, bowel movement)      Denies  10. OTHER SYMPTOMS: \"Has there been any vomiting, diarrhea, constipation, or urine problems?\"        Slow digestion but has hx of n/v   11. PREGNANCY: \"Is there any chance you are pregnant?\" \"When was your last menstrual period?\"        denies    Protocols used: Abdominal Pain - Female-ADULT-[place]    "

## 2024-06-07 ENCOUNTER — VBI (OUTPATIENT)
Dept: ADMINISTRATIVE | Facility: OTHER | Age: 49
End: 2024-06-07

## 2024-06-07 NOTE — TELEPHONE ENCOUNTER
06/07/24 11:34 AM     [employer] SmartForm used to document caregap status.    Christine Kathleen

## 2024-07-23 ENCOUNTER — PREP FOR PROCEDURE (OUTPATIENT)
Dept: GASTROENTEROLOGY | Facility: CLINIC | Age: 49
End: 2024-07-23

## 2024-07-23 ENCOUNTER — OFFICE VISIT (OUTPATIENT)
Dept: GASTROENTEROLOGY | Facility: CLINIC | Age: 49
End: 2024-07-23
Payer: COMMERCIAL

## 2024-07-23 ENCOUNTER — TELEPHONE (OUTPATIENT)
Dept: GASTROENTEROLOGY | Facility: CLINIC | Age: 49
End: 2024-07-23

## 2024-07-23 VITALS
WEIGHT: 125 LBS | BODY MASS INDEX: 19.62 KG/M2 | DIASTOLIC BLOOD PRESSURE: 81 MMHG | HEART RATE: 72 BPM | SYSTOLIC BLOOD PRESSURE: 120 MMHG | HEIGHT: 67 IN

## 2024-07-23 DIAGNOSIS — E80.6 HYPERBILIRUBINEMIA: ICD-10-CM

## 2024-07-23 DIAGNOSIS — Z12.11 SCREENING FOR COLON CANCER: ICD-10-CM

## 2024-07-23 DIAGNOSIS — K31.84 GASTROPARESIS: ICD-10-CM

## 2024-07-23 DIAGNOSIS — R11.2 NAUSEA AND VOMITING, UNSPECIFIED VOMITING TYPE: ICD-10-CM

## 2024-07-23 DIAGNOSIS — R10.11 RIGHT UPPER QUADRANT ABDOMINAL PAIN: Primary | ICD-10-CM

## 2024-07-23 PROCEDURE — 99203 OFFICE O/P NEW LOW 30 MIN: CPT | Performed by: INTERNAL MEDICINE

## 2024-07-23 RX ORDER — POLYETHYLENE GLYCOL 3350, SODIUM SULFATE ANHYDROUS, SODIUM BICARBONATE, SODIUM CHLORIDE, POTASSIUM CHLORIDE 236; 22.74; 6.74; 5.86; 2.97 G/4L; G/4L; G/4L; G/4L; G/4L
4 POWDER, FOR SOLUTION ORAL ONCE
Qty: 4000 ML | Refills: 0 | Status: SHIPPED | OUTPATIENT
Start: 2024-07-23 | End: 2024-07-23

## 2024-07-23 NOTE — TELEPHONE ENCOUNTER
Scheduled date of EGD/colonoscopy (as of today):10/1/24  Physician performing EGD/colonoscopy:Dr Monterroso   Location of EGD/colonoscopy:   Desired bowel prep reviewed with patient:darryn   Instructions reviewed with patient by:sb  Clearances:  none

## 2024-07-23 NOTE — PROGRESS NOTES
[facility] [place] - Outpatient Consultation  Pau Johnson 49 y.o. female MRN: [id number]  Encounter: [id number]          ASSESSMENT AND PLAN:      1. Right upper quadrant abdominal pain  -     US right upper quadrant; Future; Expected date: 07/23/2024  -     Chronic Hepatitis Panel; Future  -     Hepatic function panel; Future  -     ANDERS Screen w/ Reflex to Titer/Pattern; Future  -     EGD; Future; Expected date: 07/23/2024  2. Nausea and vomiting, unspecified vomiting type  -     EGD; Future; Expected date: 07/23/2024  3. Gastroparesis  -     EGD; Future; Expected date: 07/23/2024  4. Hyperbilirubinemia  -     US right upper quadrant; Future; Expected date: 07/23/2024  -     Chronic Hepatitis Panel; Future  -     Hepatic function panel; Future  -     ANDERS Screen w/ Reflex to Titer/Pattern; Future  5. Screening for colon cancer  -     Colonoscopy; Future; Expected date: 07/23/2024  -     polyethylene glycol (Golytely) 4000 mL solution; Take 4,000 mL by mouth once for 1 dose Take according to instructions given by the office for colonoscopy bowel prep.    Mild hyperbilirubinemia of nonspecific etiology, will check total and direct bilirubin, ultrasound of the abdomen as well, which may help evaluate the liver and gallbladder as well.    History of upper abdominal pain episodes of nausea vomiting questionable gastroparesis, will schedule EGD for further evaluation of above symptoms.  Colonoscopy for screening colon cancer, last exam 5 years ago.  ______________________________________________________________________    HPI:      Patient came in to discuss her history of total bilirubin elevation, was told may have Gilbert's syndrome, denies any known history of liver disease hepatitis jaundice easy bleeding bruising blood transfusions high risk behavior appetite is fair weight stable lives with  and 3 children.  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  Has had hyperbilirubinemia from 1.5-1.9.   For the last 6 years.  Has occasional episodes of upper abdominal discomfort nausea vomiting and was told she may have slows digestion/gastroparesis.  She was in the office may be 5 6 years ago but never came for a follow-up which was advised.  Occasional discomfort upper abdomen right upper quadrant, some family member has had gallbladder surgery.  Diet medications more than 10 systems reviewed.  No history of CVA stents pacemakers.      REVIEW OF SYSTEMS:    CONSTITUTIONAL: Denies any fever, chills, rigors, and weight loss.  HEENT: No earache or tinnitus.  CARDIOVASCULAR: No chest pain or palpitations.   RESPIRATORY: Denies any cough, hemoptysis, shortness of breath or dyspnea on exertion.  GASTROINTESTINAL: As noted in the History of Present Illness.   GENITOURINARY: Denies any hematuria or dysuria.  NEUROLOGIC: No dizziness or vertigo.   MUSCULOSKELETAL: Denies any joint swellings.  SKIN: Denies skin rashes or itching.   ENDOCRINE: Denies excessive thirst. Denies intolerance to heat or cold.  PSYCHOSOCIAL: Denies depression or anxiety. Denies any recent memory loss.       Historical Information   Past Medical History:   Diagnosis Date   • Bleeding gums     Last assessed 5/30/2017    • Elbow fracture     History of x-ray of elbow fracture   • Fatigue     Last assessed 5/30/2017    • Fractured coccyx (HCC)     Resolved 9/7/2015    • Microscopic hematuria     Last assessed 1/2/2018    • Night sweats     Last assesed 5/30/2017    • Paresthesias     Last assessed 5/30/2017    • Post concussion syndrome     Resolved 9/7/2015    • Wrist fracture     History of x-ray wrist fracture      History reviewed. No pertinent surgical history.  Social History   Social History     Substance and Sexual Activity   Alcohol Use Yes    Comment: occasionally     Social History     Substance and Sexual Activity   Drug Use No     Social History     Tobacco Use   Smoking Status Former   Smokeless Tobacco Never     Family History   Problem  "Relation Age of Onset   • Spina bifida Mother    • No Known Problems Sister    • No Known Problems Daughter    • No Known Problems Daughter    • Diabetes Maternal Grandfather    • Prostate cancer Maternal Grandfather 72   • No Known Problems Paternal Aunt        Meds/Allergies       Current Outpatient Medications:   •  polyethylene glycol (Golytely) 4000 mL solution  •  venlafaxine (EFFEXOR-XR) 75 mg 24 hr capsule  •  hydrOXYzine HCL (ATARAX) 25 mg tablet    Allergies   Allergen Reactions   • Sulfa Antibiotics Hives           Objective     Blood pressure 120/81, pulse 72, height 5' 7\" (1.702 m), weight 56.7 kg (125 lb). Body mass index is 19.58 kg/m².        PHYSICAL EXAM:      General Appearance:   Alert, cooperative, no distress   HEENT:   Normocephalic, atraumatic, anicteric.     Neck:  Supple, symmetrical, trachea midline   Lungs:   Clear to auscultation bilaterally; no rales, rhonchi or wheezing; respirations unlabored    Heart::   Regular rate and rhythm; no murmur.   Abdomen:   Soft, non-tender, non-distended; normal bowel sounds; no masses, no organomegaly    Genitalia:   Deferred    Rectal:   Deferred    Extremities:  No cyanosis, clubbing or edema    Skin:  No jaundice, rashes, or lesions    Lymph nodes:  No palpable cervical lymphadenopathy        Lab Results:   No visits with results within 1 Day(s) from this visit.   Latest known visit with results is:   Orders Only on 05/02/2024   Component Date Value   • Total Cholesterol 05/02/2024 177    • HDL 05/02/2024 70    • Triglycerides 05/02/2024 86    • LDL Calculated 05/02/2024 89    • Chol HDLC Ratio 05/02/2024 2.5    • Non-HDL Cholesterol 05/02/2024 107    • TSH 05/02/2024 1.13    • Free t4 05/02/2024 1.1    • Glucose, Random 05/02/2024 94    • BUN 05/02/2024 18    • Creatinine 05/02/2024 0.94    • eGFR 05/02/2024 76    • SL AMB BUN/CREATININE RA* 05/02/2024 SEE NOTE:    • Sodium 05/02/2024 132 (L)    • Potassium 05/02/2024 4.9    • Chloride 05/02/2024 98 "    • CO2 05/02/2024 25    • Calcium 05/02/2024 9.6    • Protein, Total 05/02/2024 7.6    • Albumin 05/02/2024 4.9    • Globulin 05/02/2024 2.7    • Albumin/Globulin Ratio 05/02/2024 1.8    • TOTAL BILIRUBIN 05/02/2024 1.9 (H)    • Alkaline Phosphatase 05/02/2024 46    • AST 05/02/2024 19    • ALT 05/02/2024 14    • Hemoglobin A1C 05/02/2024 5.3          Radiology Results:   No results found.

## 2024-08-02 ENCOUNTER — HOSPITAL ENCOUNTER (OUTPATIENT)
Dept: RADIOLOGY | Age: 49
Discharge: HOME | End: 2024-08-02
Payer: COMMERCIAL

## 2024-08-02 VITALS — BODY MASS INDEX: 19.62 KG/M2 | WEIGHT: 125 LBS | HEIGHT: 67 IN

## 2024-08-02 DIAGNOSIS — Z12.31 ENCOUNTER FOR SCREENING MAMMOGRAM FOR MALIGNANT NEOPLASM OF BREAST: ICD-10-CM

## 2024-08-02 PROCEDURE — 77067 SCR MAMMO BI INCL CAD: CPT

## 2024-08-02 PROCEDURE — 77063 BREAST TOMOSYNTHESIS BI: CPT

## 2024-08-06 NOTE — RESULT ENCOUNTER NOTE
Pau Johnson,     Your mammogram looks good.  We will plan to repeat this screening in 1 year.    Take care,  Chandrika Quintana, DO

## 2024-08-26 ENCOUNTER — VBI (OUTPATIENT)
Dept: ADMINISTRATIVE | Facility: OTHER | Age: 49
End: 2024-08-26

## 2024-08-26 NOTE — TELEPHONE ENCOUNTER
08/26/24 9:20 AM     Chart reviewed for Pap Smear (HPV) aka Cervical Cancer Screening ; nothing is submitted to the patient's insurance at this time.     Christine Kathleen MA   [facility]

## 2024-09-26 ENCOUNTER — OFFICE VISIT (OUTPATIENT)
Age: 49
End: 2024-09-26
Payer: COMMERCIAL

## 2024-09-26 ENCOUNTER — RA CDI HCC (OUTPATIENT)
Dept: OTHER | Facility: HOSPITAL | Age: 49
End: 2024-09-26

## 2024-09-26 VITALS
RESPIRATION RATE: 18 BRPM | SYSTOLIC BLOOD PRESSURE: 126 MMHG | HEIGHT: 67 IN | BODY MASS INDEX: 19.09 KG/M2 | DIASTOLIC BLOOD PRESSURE: 72 MMHG | HEART RATE: 78 BPM | WEIGHT: 121.6 LBS | TEMPERATURE: 98 F

## 2024-09-26 DIAGNOSIS — Z78.9 ALCOHOL USE: ICD-10-CM

## 2024-09-26 DIAGNOSIS — F32.1 MODERATE MAJOR DEPRESSION (HCC): Primary | ICD-10-CM

## 2024-09-26 DIAGNOSIS — F41.9 ANXIETY: ICD-10-CM

## 2024-09-26 PROCEDURE — 99213 OFFICE O/P EST LOW 20 MIN: CPT

## 2024-09-26 RX ORDER — NITROFURANTOIN 25; 75 MG/1; MG/1
CAPSULE ORAL
COMMUNITY
Start: 2024-09-02 | End: 2024-09-26

## 2024-09-26 RX ORDER — ESCITALOPRAM OXALATE 5 MG/1
5 TABLET ORAL DAILY
Qty: 30 TABLET | Refills: 1 | Status: SHIPPED | OUTPATIENT
Start: 2024-09-26

## 2024-09-26 NOTE — PROGRESS NOTES
Ambulatory Visit  Name: Pau Johnson      : 1975      MRN: [id number]  Encounter Provider: Katelyn Soria DO  Encounter Date: 2024   Encounter department: [facility]    Assessment & Plan  Moderate major depression (HCC)  Recently feels mood symptoms have been worse, discontinued venlafaxine as she did not feel it was helpful.  Has also previously taken sertraline and fluoxetine;   Start escitalopram 5mg daily and f/up in one month to recheck mood.  Continue talk therapy at this time.    Orders:    escitalopram (LEXAPRO) 5 mg tablet; Take 1 tablet (5 mg total) by mouth daily    Anxiety  Patient reports she has not been taking hydroxyzine for anxiety  Start escitalopram 5mg daily and f/up in one month to recheck mood.  Orders:    escitalopram (LEXAPRO) 5 mg tablet; Take 1 tablet (5 mg total) by mouth daily    Alcohol use  Continue counseling at this time          Return in about 4 weeks (around 10/24/2024) for Recheck mood.    History of Present Illness     Pau presents today for mood follow-up appointment.  She reports she was previously taking venlafaxine, however she felt it was not helpful for her and discontinued it.  She states usually in September she will start to have a dip in her mood each year.  She also reports she has recently been having perimenopausal symptoms including mood swings and fatigue/insomnia, along with night sweats.  She does currently see a provider for talk therapy.       Review of Systems   Constitutional:  Positive for fatigue. Negative for chills and fever.   Eyes:  Negative for visual disturbance.   Musculoskeletal:  Positive for myalgias.   Neurological:  Negative for headaches.   Psychiatric/Behavioral:  Positive for sleep disturbance. The patient is nervous/anxious.      Past Medical History:   Diagnosis Date    Bleeding gums     Last assessed 2017     Elbow fracture     History of x-ray of elbow fracture    Fatigue     Last assessed  "5/30/2017     Fractured coccyx (HCC)     Resolved 9/7/2015     Microscopic hematuria     Last assessed 1/2/2018     Night sweats     Last assesed 5/30/2017     Paresthesias     Last assessed 5/30/2017     Post concussion syndrome     Resolved 9/7/2015     Wrist fracture     History of x-ray wrist fracture      History reviewed. No pertinent surgical history.  Family History   Problem Relation Age of Onset    Spina bifida Mother     No Known Problems Father     No Known Problems Sister     No Known Problems Daughter     No Known Problems Daughter     Diabetes Maternal Grandfather     Prostate cancer Maternal Grandfather 72    No Known Problems Paternal Aunt      Social History     Tobacco Use    Smoking status: Former    Smokeless tobacco: Never   Vaping Use    Vaping status: Never Used   Substance and Sexual Activity    Alcohol use: Yes     Comment: occasionally    Drug use: No    Sexual activity: Yes     Partners: Male     Birth control/protection: Male Sterilization     Current Outpatient Medications on File Prior to Visit   Medication Sig    polyethylene glycol (Golytely) 4000 mL solution Take 4,000 mL by mouth once for 1 dose Take according to instructions given by the office for colonoscopy bowel prep.     Allergies   Allergen Reactions    Sulfa Antibiotics Hives     Immunization History   Administered Date(s) Administered    COVID-19 MODERNA VACC 0.5 ML IM 04/12/2021, 05/10/2021, 12/10/2021    Tdap 01/01/2018    Tuberculin Skin Test-PPD Intradermal 03/27/2023     Objective     /72   Pulse 78   Temp 98 °F (36.7 °C)   Resp 18   Ht 5' 7\" (1.702 m)   Wt 55.2 kg (121 lb 9.6 oz)   BMI 19.05 kg/m²     Physical Exam  Vitals reviewed.   Constitutional:       General: She is not in acute distress.     Appearance: Normal appearance. She is not ill-appearing or toxic-appearing.   HENT:      Head: Normocephalic and atraumatic.      Nose: Nose normal.   Eyes:      General:         Right eye: No discharge.        "  Left eye: No discharge.      Conjunctiva/sclera: Conjunctivae normal.   Cardiovascular:      Rate and Rhythm: Normal rate and regular rhythm.   Pulmonary:      Effort: Pulmonary effort is normal. No respiratory distress.   Musculoskeletal:         General: Normal range of motion.      Right lower leg: No edema.      Left lower leg: No edema.   Skin:     General: Skin is warm and dry.      Capillary Refill: Capillary refill takes less than 2 seconds.   Neurological:      Mental Status: She is alert and oriented to person, place, and time. Mental status is at baseline.   Psychiatric:         Mood and Affect: Mood normal.         Behavior: Behavior normal.         Katelyn Soria, DO

## 2024-09-26 NOTE — ASSESSMENT & PLAN NOTE
Patient reports she has not been taking hydroxyzine for anxiety  Start escitalopram 5mg daily and f/up in one month to recheck mood.  Orders:    escitalopram (LEXAPRO) 5 mg tablet; Take 1 tablet (5 mg total) by mouth daily

## 2024-09-26 NOTE — ASSESSMENT & PLAN NOTE
Recently feels mood symptoms have been worse, discontinued venlafaxine as she did not feel it was helpful.  Has also previously taken sertraline and fluoxetine;   Start escitalopram 5mg daily and f/up in one month to recheck mood.  Continue talk therapy at this time.    Orders:    escitalopram (LEXAPRO) 5 mg tablet; Take 1 tablet (5 mg total) by mouth daily

## 2024-10-24 ENCOUNTER — TELEMEDICINE (OUTPATIENT)
Age: 49
End: 2024-10-24
Payer: COMMERCIAL

## 2024-10-24 DIAGNOSIS — F32.1 MODERATE MAJOR DEPRESSION (HCC): Primary | ICD-10-CM

## 2024-10-24 DIAGNOSIS — F41.9 ANXIETY: ICD-10-CM

## 2024-10-24 PROCEDURE — 99213 OFFICE O/P EST LOW 20 MIN: CPT

## 2024-10-24 RX ORDER — ESCITALOPRAM OXALATE 10 MG/1
10 TABLET ORAL DAILY
Qty: 60 TABLET | Refills: 1 | Status: SHIPPED | OUTPATIENT
Start: 2024-10-24

## 2024-10-24 NOTE — PROGRESS NOTES
Virtual Regular Visit  Name: Pau Johnson      : 1975      MRN: [id number]  Encounter Provider: Katelyn Soria DO  Encounter Date: 10/24/2024   Encounter department: [facility]    Verification of patient location:    Patient is located at Home in the following state in which I hold an active license [place]    Assessment & Plan  Moderate major depression (HCC)  Presents to clinic via virtual visit to follow-up on mood  Increase to 10 mg Lexapro daily at this time, Rx sent  Continue talk therapy  Return in 4 weeks to recheck mood    Orders:    escitalopram (LEXAPRO) 10 mg tablet; Take 1 tablet (10 mg total) by mouth daily         Encounter provider Katelyn Soria DO    The patient was identified by name and date of birth. Pau Johnson was informed that this is a telemedicine visit and that the visit is being conducted through the Epic Embedded platform. She agrees to proceed..  My office door was closed. No one else was in the room.  She acknowledged consent and understanding of privacy and security of the video platform. The patient has agreed to participate and understands they can discontinue the visit at any time.    Patient is aware this is a billable service.     History of Present Illness     Pau presents today for video visit to follow-up on mood.  She feels that her 5 mg daily Lexapro dose has been neutral to slightly positive in terms of affect and would like to escalate to 10 mg dose at this time.  Continues to experience slightly depressed mood which seems to be correlated to arrival of early to mid fall season.    Has previously taken venlafaxine, sertraline, fluoxetine and did not feel these were efficacious for her.        History obtained from : patient  Review of Systems  Medical History Reviewed by provider this encounter:  Tobacco  Allergies  Meds  Problems  Med Hx  Surg Hx  Fam Hx       Current Outpatient Medications on File Prior to Visit   Medication  Sig Dispense Refill    escitalopram (LEXAPRO) 5 mg tablet Take 1 tablet (5 mg total) by mouth daily 30 tablet 1    polyethylene glycol (Golytely) 4000 mL solution Take 4,000 mL by mouth once for 1 dose Take according to instructions given by the office for colonoscopy bowel prep. 4000 mL 0     No current facility-administered medications on file prior to visit.      Social History     Tobacco Use    Smoking status: Former    Smokeless tobacco: Never   Vaping Use    Vaping status: Never Used   Substance and Sexual Activity    Alcohol use: Yes     Comment: occasionally    Drug use: No    Sexual activity: Yes     Partners: Male     Birth control/protection: Male Sterilization         Objective     There were no vitals taken for this visit.  Physical Exam  Constitutional:       General: She is not in acute distress.     Appearance: Normal appearance. She is not ill-appearing or toxic-appearing.   HENT:      Nose: Nose normal.   Eyes:      General:         Right eye: No discharge.         Left eye: No discharge.      Conjunctiva/sclera: Conjunctivae normal.   Cardiovascular:      Rate and Rhythm: Normal rate.   Pulmonary:      Effort: Pulmonary effort is normal. No respiratory distress.   Neurological:      Mental Status: She is alert and oriented to person, place, and time. Mental status is at baseline.   Psychiatric:         Mood and Affect: Mood normal.         Behavior: Behavior normal.       Visit Time  Total Visit Duration: 6 min    Katelyn Soria DO

## 2024-10-24 NOTE — ASSESSMENT & PLAN NOTE
Presents to clinic via virtual visit to follow-up on mood  Increase to 10 mg Lexapro daily at this time, Rx sent  Continue talk therapy  Return in 4 weeks to recheck mood    Orders:    escitalopram (LEXAPRO) 10 mg tablet; Take 1 tablet (10 mg total) by mouth daily

## 2024-12-03 ENCOUNTER — TELEMEDICINE (OUTPATIENT)
Dept: OTHER | Facility: HOSPITAL | Age: 49
End: 2024-12-03
Payer: COMMERCIAL

## 2024-12-03 DIAGNOSIS — J01.00 ACUTE NON-RECURRENT MAXILLARY SINUSITIS: Primary | ICD-10-CM

## 2024-12-03 PROCEDURE — 99213 OFFICE O/P EST LOW 20 MIN: CPT | Performed by: PHYSICIAN ASSISTANT

## 2024-12-03 NOTE — PROGRESS NOTES
Virtual Regular Visit  Name: Pau Johnson      : 1975      MRN: [id number]  Encounter Provider: Shannon D Severino, PA-C  Encounter Date: 12/3/2024   Encounter department: V[facility]UAL CARE       Verification of patient location:  Patient is located at Home in the following state in which I hold an active license [place] :  Assessment & Plan  Acute non-recurrent maxillary sinusitis    Orders:    amoxicillin-clavulanate (AUGMENTIN) 875-125 mg per tablet; Take 1 tablet by mouth 2 (two) times a day for 10 days        Encounter provider Shannon D Severino, PA-C    The patient was identified by name and date of birth. Pau Johnson was informed that this is a telemedicine visit and that the visit is being conducted through the Epic Embedded platform. She agrees to proceed..  My office door was closed. No one else was in the room.  She acknowledged consent and understanding of privacy and security of the video platform. The patient has agreed to participate and understands they can discontinue the visit at any time.    Patient is aware this is a billable service.     History was obtained from: History obtained from: patient  History of Present Illness     Pt reports sinus infection. Sick since 6 days Ago, sx overall getting worse. Has pressure in teeth, pressure in face, night time chills, tmax 99.9, congestion. Tried mucinex with temporary relief. No CP, SOB.       Review of Systems   Constitutional:  Negative for fever.   HENT:  Positive for congestion, postnasal drip, sinus pressure and sinus pain. Negative for nosebleeds and sore throat.    Eyes:  Negative for redness.   Respiratory:  Negative for cough and shortness of breath.    Cardiovascular:  Negative for chest pain.   Gastrointestinal:  Negative for blood in stool.   Genitourinary:  Negative for hematuria.   Musculoskeletal:  Negative for gait problem.   Skin:  Negative for rash.   Neurological:  Negative for seizures.   Psychiatric/Behavioral:  Negative for  behavioral problems.        Objective   There were no vitals taken for this visit.    Physical Exam  Constitutional:       General: She is not in acute distress.     Appearance: Normal appearance. She is not toxic-appearing.   HENT:      Head: Normocephalic and atraumatic.      Nose: No rhinorrhea.      Mouth/Throat:      Mouth: Mucous membranes are moist.   Eyes:      Conjunctiva/sclera: Conjunctivae normal.   Pulmonary:      Effort: Pulmonary effort is normal. No respiratory distress.      Breath sounds: No wheezing (no gross audible wheeze through computer).   Musculoskeletal:      Cervical back: Normal range of motion.   Skin:     Findings: No rash (on face or neck).   Neurological:      Mental Status: She is alert.      Cranial Nerves: No dysarthria or facial asymmetry.   Psychiatric:         Mood and Affect: Mood normal.         Behavior: Behavior normal.         Visit Time  Total Visit Duration: 4 minutes not including the time spent for establishing the audio/video connection.

## 2024-12-03 NOTE — PATIENT INSTRUCTIONS
"As discussed, sinus infections are typically viral and will get better on their own in 7-10 days. You should try symptomatic relief in the meantime with OTC antihistamine (Claritin or Zyrtec), Afrin for up to 3 days then switch to Flonase, and Sudafed (behind the counter) and mucinex. You can also try sinus rinses with a neti pot or nasal lavage (be sure to use distilled water.) Sleep with a cool mist humidifier at your bedside. If your symptoms do not improve after 7-10 days, you may need antibiotics because it is more likely to be bacterial at that point.  Follow-up with your primary care physician for recheck in 2-3 business days. Go to the ER for sudden severe headache, high fevers, change in vision, seizure activity or anything else that is concerning.    [facility] Phone number is [phone] if you need assistance or have further questions  note in \"Letters\" section of [employer]. Can print if opened from a web browser    "

## 2025-01-07 ENCOUNTER — VBI (OUTPATIENT)
Dept: ADMINISTRATIVE | Facility: OTHER | Age: 50
End: 2025-01-07

## 2025-01-07 NOTE — TELEPHONE ENCOUNTER
01/07/25 5:40 AM     Chart reviewed for   Cervical Cancer Screening    ; nothing is submitted to the patient's insurance at this time.     CHRIS SIDHU MA   [facility]

## 2025-03-18 ENCOUNTER — TELEPHONE (OUTPATIENT)
Age: 50
End: 2025-03-18

## 2025-03-25 ENCOUNTER — HOSPITAL ENCOUNTER (OUTPATIENT)
Dept: ULTRASOUND IMAGING | Facility: HOSPITAL | Age: 50
Discharge: HOME | End: 2025-03-25
Attending: INTERNAL MEDICINE
Payer: COMMERCIAL

## 2025-03-25 DIAGNOSIS — E80.6 HYPERBILIRUBINEMIA: ICD-10-CM

## 2025-03-25 DIAGNOSIS — R10.11 RIGHT UPPER QUADRANT ABDOMINAL PAIN: ICD-10-CM

## 2025-03-25 PROCEDURE — 76705 ECHO EXAM OF ABDOMEN: CPT

## 2025-03-31 ENCOUNTER — RESULTS FOLLOW-UP (OUTPATIENT)
Dept: GASTROENTEROLOGY | Facility: CLINIC | Age: 50
End: 2025-03-31

## 2025-05-13 ENCOUNTER — OFFICE VISIT (OUTPATIENT)
Age: 50
End: 2025-05-13
Payer: COMMERCIAL

## 2025-05-13 VITALS
DIASTOLIC BLOOD PRESSURE: 74 MMHG | HEART RATE: 77 BPM | WEIGHT: 122 LBS | TEMPERATURE: 97.5 F | BODY MASS INDEX: 19.11 KG/M2 | OXYGEN SATURATION: 99 % | SYSTOLIC BLOOD PRESSURE: 118 MMHG

## 2025-05-13 DIAGNOSIS — R53.83 OTHER FATIGUE: ICD-10-CM

## 2025-05-13 DIAGNOSIS — R20.2 PARESTHESIAS: Primary | ICD-10-CM

## 2025-05-13 DIAGNOSIS — F32.1 MODERATE MAJOR DEPRESSION (HCC): ICD-10-CM

## 2025-05-13 PROCEDURE — 99214 OFFICE O/P EST MOD 30 MIN: CPT

## 2025-05-13 RX ORDER — VENLAFAXINE HYDROCHLORIDE 75 MG/1
75 CAPSULE, EXTENDED RELEASE ORAL
Qty: 30 CAPSULE | Refills: 5 | Status: SHIPPED | OUTPATIENT
Start: 2025-05-13

## 2025-05-13 NOTE — PROGRESS NOTES
"Name: Pau Johnson      : 1975      MRN: [id number]  Encounter Provider: Brent Mooney MD  Encounter Date: 2025   Encounter department: [facility]  :  Assessment & Plan  Paresthesias  Over 1 week history of bilateral paresthesia of lower extremities below the knee.  Symptoms include muscle cramping as well as feeling of \"fullness\" in both ankles but no actual swelling.  History of restless leg syndrome as well as MDD, family history of rheumatoid arthritis.  Will evaluate blood work to investigate etiology such as rheumatological vs vitamin deficiency/overdosing vs iron deficiency vs electrolyte abnormality.  Patient started on venlafaxine for major depressive disorder, but medication can help address perimenopausal symptoms/paresthesia.  Continue to monitor symptoms and will follow-up in 1 month.  Orders:    Comprehensive metabolic panel; Future    Moderate major depression (HCC)  Depression Screening Follow-up Plan: Patient's depression screening was positive with a PHQ-9 score of 10. Patient with underlying depression and was advised to continue current medications as prescribed.  Positive PHQ-9 today- 10.  Initiated venlafaxine 70 mg, will follow-up in 1 month to reassess depressive symptoms.  Continue to monitor current treatment regimen with symptom reduction.  Orders:    venlafaxine (EFFEXOR-XR) 75 mg 24 hr capsule; Take 1 capsule (75 mg total) by mouth daily with breakfast    Other fatigue    Orders:    Comprehensive metabolic panel; Future    CBC and differential; Future    C-reactive protein; Future    Sedimentation rate, automated; Future    Vitamin B12; Future          Depression Screening and Follow-up Plan:   Patient with underlying depression and was advised to continue current medications as prescribed.       History of Present Illness   49-year-old female presents to the clinic with over 1 week history of tingling in both of her legs below her knees.  She denies " "any back pain or any trauma.  She was recently sick with a viral URI which has now resolved.  She denies any weakness, or numbness affecting her ADLs.  She has a history of restless leg syndrome and notes this episode is worse than her usual symptoms.  She has no history of autoimmune conditions, however her grandmother passed away at a young age from rheumatoid arthritis complications.  She denies any food allergies or nutritional deficiency but notes she has slow gastric emptying.  She also notes she takes more than the recommended dosing of vitamin B12 supplementation because she \"likes the way it feels\".  She also notes cramping in her legs as well.  To note, she also has a history of major depressive disorder and stopped taking her Lexapro as she was not happy with the made her feel.  Also related, she is describing other perimenopausal symptoms as well.  Other than what is noted above, she has no concerns or questions at this time.      Review of Systems   Constitutional:  Negative for chills and fever.   HENT:  Negative for ear pain and sore throat.    Eyes:  Negative for pain and visual disturbance.   Respiratory:  Negative for cough and shortness of breath.    Cardiovascular:  Negative for chest pain and palpitations.   Gastrointestinal:  Negative for abdominal pain, constipation, diarrhea, nausea and vomiting.   Genitourinary:  Negative for dysuria and hematuria.   Musculoskeletal:  Positive for arthralgias and joint swelling. Negative for back pain, gait problem, myalgias and neck pain.   Skin:  Negative for color change and rash.   Allergic/Immunologic: Negative for environmental allergies, food allergies and immunocompromised state.   Neurological:  Negative for seizures and syncope.   All other systems reviewed and are negative.      Objective   /74   Pulse 77   Temp 97.5 °F (36.4 °C)   Wt 55.3 kg (122 lb)   SpO2 99%   BMI 19.11 kg/m²      Physical Exam  Vitals and nursing note reviewed. "   Constitutional:       General: She is not in acute distress.     Appearance: She is well-developed.   HENT:      Head: Normocephalic and atraumatic.   Eyes:      Conjunctiva/sclera: Conjunctivae normal.   Cardiovascular:      Rate and Rhythm: Normal rate and regular rhythm.      Heart sounds: No murmur heard.  Pulmonary:      Effort: Pulmonary effort is normal. No respiratory distress.      Breath sounds: Normal breath sounds.   Abdominal:      Palpations: Abdomen is soft.      Tenderness: There is no abdominal tenderness.   Musculoskeletal:         General: No swelling.      Cervical back: Neck supple.   Skin:     General: Skin is warm and dry.      Capillary Refill: Capillary refill takes less than 2 seconds.   Neurological:      Mental Status: She is alert.   Psychiatric:         Mood and Affect: Mood normal.

## 2025-05-13 NOTE — ASSESSMENT & PLAN NOTE
Orders:    Comprehensive metabolic panel; Future    CBC and differential; Future    C-reactive protein; Future    Sedimentation rate, automated; Future    Vitamin B12; Future

## 2025-05-13 NOTE — ASSESSMENT & PLAN NOTE
"Over 1 week history of bilateral paresthesia of lower extremities below the knee.  Symptoms include muscle cramping as well as feeling of \"fullness\" in both ankles but no actual swelling.  History of restless leg syndrome as well as MDD, family history of rheumatoid arthritis.  Will evaluate blood work to investigate etiology such as rheumatological vs vitamin deficiency/overdosing vs iron deficiency vs electrolyte abnormality.  Patient started on venlafaxine for major depressive disorder, but medication can help address perimenopausal symptoms/paresthesia.  Continue to monitor symptoms and will follow-up in 1 month.  Orders:    Comprehensive metabolic panel; Future    "

## 2025-05-13 NOTE — ASSESSMENT & PLAN NOTE
Depression Screening Follow-up Plan: Patient's depression screening was positive with a PHQ-9 score of 10. Patient with underlying depression and was advised to continue current medications as prescribed.  Positive PHQ-9 today- 10.  Initiated venlafaxine 70 mg, will follow-up in 1 month to reassess depressive symptoms.  Continue to monitor current treatment regimen with symptom reduction.  Orders:    venlafaxine (EFFEXOR-XR) 75 mg 24 hr capsule; Take 1 capsule (75 mg total) by mouth daily with breakfast

## 2025-05-14 LAB
ALBUMIN SERPL-MCNC: 4.8 G/DL (ref 3.6–5.1)
ALBUMIN/GLOB SERPL: 1.9 (CALC) (ref 1–2.5)
ALP SERPL-CCNC: 47 U/L (ref 31–125)
ALT SERPL-CCNC: 13 U/L (ref 6–29)
AST SERPL-CCNC: 17 U/L (ref 10–35)
BASOPHILS # BLD AUTO: 70 CELLS/UL (ref 0–200)
BASOPHILS NFR BLD AUTO: 0.9 %
BILIRUB SERPL-MCNC: 1.7 MG/DL (ref 0.2–1.2)
BUN SERPL-MCNC: 12 MG/DL (ref 7–25)
BUN/CREAT SERPL: ABNORMAL (CALC) (ref 6–22)
CALCIUM SERPL-MCNC: 9.8 MG/DL (ref 8.6–10.2)
CHLORIDE SERPL-SCNC: 101 MMOL/L (ref 98–110)
CO2 SERPL-SCNC: 26 MMOL/L (ref 20–32)
CREAT SERPL-MCNC: 0.64 MG/DL (ref 0.5–0.99)
CRP SERPL-MCNC: <3 MG/L
EOSINOPHIL # BLD AUTO: 148 CELLS/UL (ref 15–500)
EOSINOPHIL NFR BLD AUTO: 1.9 %
ERYTHROCYTE [DISTWIDTH] IN BLOOD BY AUTOMATED COUNT: 12.4 % (ref 11–15)
ERYTHROCYTE [SEDIMENTATION RATE] IN BLOOD BY WESTERGREN METHOD: 2 MM/H
GFR/BSA.PRED SERPLBLD CYS-BASED-ARV: 110 ML/MIN/1.73M2
GLOBULIN SER CALC-MCNC: 2.5 G/DL (CALC) (ref 1.9–3.7)
GLUCOSE SERPL-MCNC: 93 MG/DL (ref 65–139)
HCT VFR BLD AUTO: 37.5 % (ref 35–45)
HGB BLD-MCNC: 12.6 G/DL (ref 11.7–15.5)
LYMPHOCYTES # BLD AUTO: 2410 CELLS/UL (ref 850–3900)
LYMPHOCYTES NFR BLD AUTO: 30.9 %
MCH RBC QN AUTO: 32.5 PG (ref 27–33)
MCHC RBC AUTO-ENTMCNC: 33.6 G/DL (ref 32–36)
MCV RBC AUTO: 96.6 FL (ref 80–100)
MONOCYTES # BLD AUTO: 608 CELLS/UL (ref 200–950)
MONOCYTES NFR BLD AUTO: 7.8 %
NEUTROPHILS # BLD AUTO: 4563 CELLS/UL (ref 1500–7800)
NEUTROPHILS NFR BLD AUTO: 58.5 %
PLATELET # BLD AUTO: 316 THOUSAND/UL (ref 140–400)
PMV BLD REES-ECKER: 10.5 FL (ref 7.5–12.5)
POTASSIUM SERPL-SCNC: 4.5 MMOL/L (ref 3.5–5.3)
PROT SERPL-MCNC: 7.3 G/DL (ref 6.1–8.1)
RBC # BLD AUTO: 3.88 MILLION/UL (ref 3.8–5.1)
SODIUM SERPL-SCNC: 137 MMOL/L (ref 135–146)
VIT B12 SERPL-MCNC: 554 PG/ML (ref 200–1100)
WBC # BLD AUTO: 7.8 THOUSAND/UL (ref 3.8–10.8)

## 2025-05-16 ENCOUNTER — RESULTS FOLLOW-UP (OUTPATIENT)
Dept: OBGYN CLINIC | Facility: CLINIC | Age: 50
End: 2025-05-16

## 2025-05-16 NOTE — RESULT ENCOUNTER NOTE
Discussed all lab results with patient including CBC, CMP, B12, CRP and ESR.  All within normal limits.  Discussed return to office precautions regarding parasthesias.

## 2025-05-22 ENCOUNTER — TELEMEDICINE (OUTPATIENT)
Dept: OTHER | Facility: HOSPITAL | Age: 50
End: 2025-05-22
Payer: COMMERCIAL

## 2025-05-22 DIAGNOSIS — N39.0 URINARY TRACT INFECTION WITHOUT HEMATURIA, SITE UNSPECIFIED: Primary | ICD-10-CM

## 2025-05-22 PROCEDURE — 99213 OFFICE O/P EST LOW 20 MIN: CPT | Performed by: NURSE PRACTITIONER

## 2025-05-22 RX ORDER — CEPHALEXIN 500 MG/1
500 CAPSULE ORAL EVERY 12 HOURS SCHEDULED
Qty: 10 CAPSULE | Refills: 0 | Status: SHIPPED | OUTPATIENT
Start: 2025-05-22 | End: 2025-05-27

## 2025-05-22 NOTE — PROGRESS NOTES
Virtual Regular Visit  Name: Pau Johnson      : 1975      MRN: [id number]  Encounter Provider: Your Video Visit Provider  Encounter Date: 2025   Encounter department: [facility]       Verification of patient location:  Patient is located at Home in the following state in which I hold an active license [place] :  Assessment & Plan  Urinary tract infection without hematuria, site unspecified    Orders:    cephalexin (KEFLEX) 500 mg capsule; Take 1 capsule (500 mg total) by mouth every 12 (twelve) hours for 5 days        Encounter provider Your Video Visit Provider    The patient was identified by name and date of birth. Pau Johnson was informed that this is a telemedicine visit and that the visit is being conducted through the Epic Embedded platform. She agrees to proceed..  My office door was closed. No one else was in the room. She acknowledged consent and understanding of privacy and security of the video platform. The patient has agreed to participate and understands they can discontinue the visit at any time.    Patient is aware this is a billable service.     History obtained from: patient  History of Present Illness     This is a 50 year old female here today for video visit.  She statest she started with urinary frequency.  She is now having burning and urgency.   Last UTI was about 5 months ago.  No fever, bodyaches or chills.  No abd or back pain.       Review of Systems   Constitutional: Negative.    Respiratory: Negative.     Cardiovascular: Negative.    Genitourinary:  Positive for dysuria, frequency and urgency.   Neurological: Negative.    Psychiatric/Behavioral: Negative.         Objective   There were no vitals taken for this visit.    Physical Exam  Constitutional:       General: She is not in acute distress.     Appearance: Normal appearance. She is not ill-appearing or toxic-appearing.   HENT:      Head: Normocephalic and atraumatic.   Pulmonary:      Effort: Pulmonary effort is  normal. No respiratory distress.     Neurological:      Mental Status: She is alert and oriented to person, place, and time.     Psychiatric:         Mood and Affect: Mood normal.         Behavior: Behavior normal.         Thought Content: Thought content normal.         Judgment: Judgment normal.         Visit Time  Total Visit Duration: 5 minutes not including the time spent for establishing the audio/video connection.

## 2025-05-22 NOTE — PATIENT INSTRUCTIONS
Start antibiotic. Take probiotic.  Increase oral fluids.  Tylenol or Motrin for pain or fever.  Azo for bladder spasms.  Follow up with PCP if no improvement.  Go to ER with abd pain, flank pain or worsening symptoms.       Urinary Tract Infection in Women   WHAT YOU NEED TO KNOW:   A urinary tract infection (UTI) is caused by bacteria that get inside your urinary tract. Most bacteria that enter your urinary tract come out when you urinate. If the bacteria stay in your urinary tract, you may get an infection. Your urinary tract includes your kidneys, ureters, bladder, and urethra. Urine is made in your kidneys, and it flows from the ureters to the bladder. Urine leaves the bladder through the urethra. A UTI is more common in your lower urinary tract, which includes your bladder and urethra.        DISCHARGE INSTRUCTIONS:   Return to the emergency department if:   You are urinating very little or not at all.    You have a high fever with shaking chills.     You have side or back pain that gets worse.  Contact your healthcare provider if:   You have a fever.    You do not feel better after 2 days of taking antibiotics.    You are vomiting.     You have questions or concerns about your condition or care.  Medicines:   Antibiotics  help fight a bacterial infection.     Medicines  may be given to decrease pain and burning when you urinate. They will also help decrease the feeling that you need to urinate often. These medicines will make your urine orange or red.    Take your medicine as directed.  Contact your healthcare provider if you think your medicine is not helping or if you have side effects. Tell him or her if you are allergic to any medicine. Keep a list of the medicines, vitamins, and herbs you take. Include the amounts, and when and why you take them. Bring the list or the pill bottles to follow-up visits. Carry your medicine list with you in case of an emergency.  Follow up with your healthcare provider as  directed:  Write down your questions so you remember to ask them during your visits.   Prevent another UTI:   Empty your bladder often.  Urinate and empty your bladder as soon as you feel the need. Do not hold your urine for long periods of time.    Wipe from front to back after you urinate or have a bowel movement.  This will help prevent germs from getting into your urinary tract through your urethra.    Drink liquids as directed.  Ask how much liquid to drink each day and which liquids are best for you. You may need to drink more liquids than usual to help flush out the bacteria. Do not drink alcohol, caffeine, or citrus juices. These can irritate your bladder and increase your symptoms. Your healthcare provider may recommend cranberry juice to help prevent a UTI.    Urinate after you have sex.  This can help flush out bacteria passed during sex.    Do not douche or use feminine deodorants.  These can change the chemical balance in your vagina.    Change sanitary pads or tampons often.  This will help prevent germs from getting into your urinary tract.     Do pelvic muscle exercises often.  Pelvic muscle exercises may help you start and stop urinating. Strong pelvic muscles may help you empty your bladder easier. Squeeze these muscles tightly for 5 seconds like you are trying to hold back urine. Then relax for 5 seconds. Gradually work up to squeezing for 10 seconds. Do 3 sets of 15 repetitions a day, or as directed.  © 2017 [employer] Information is for End User's use only and may not be sold, redistributed or otherwise used for commercial purposes. All illustrations and images included in CareNotes® are the copyrighted property of [employer] or [employer].  The above information is an  only. It is not intended as medical advice for individual conditions or treatments. Talk to your doctor, nurse or pharmacist before following any medical regimen to see if it is  safe and effective for you.